# Patient Record
Sex: FEMALE | Race: BLACK OR AFRICAN AMERICAN | NOT HISPANIC OR LATINO | Employment: OTHER | URBAN - METROPOLITAN AREA
[De-identification: names, ages, dates, MRNs, and addresses within clinical notes are randomized per-mention and may not be internally consistent; named-entity substitution may affect disease eponyms.]

---

## 2017-05-07 ENCOUNTER — HOSPITAL ENCOUNTER (EMERGENCY)
Facility: HOSPITAL | Age: 67
Discharge: HOME | End: 2017-05-07
Attending: EMERGENCY MEDICINE
Payer: MEDICARE

## 2017-05-07 VITALS
SYSTOLIC BLOOD PRESSURE: 148 MMHG | BODY MASS INDEX: 32.65 KG/M2 | WEIGHT: 208 LBS | HEART RATE: 76 BPM | RESPIRATION RATE: 18 BRPM | HEIGHT: 67 IN | DIASTOLIC BLOOD PRESSURE: 71 MMHG | OXYGEN SATURATION: 100 % | TEMPERATURE: 98 F

## 2017-05-07 DIAGNOSIS — N39.0 URINARY TRACT INFECTION WITH HEMATURIA, SITE UNSPECIFIED: ICD-10-CM

## 2017-05-07 DIAGNOSIS — R53.1 WEAKNESS GENERALIZED: Primary | ICD-10-CM

## 2017-05-07 DIAGNOSIS — R31.9 URINARY TRACT INFECTION WITH HEMATURIA, SITE UNSPECIFIED: ICD-10-CM

## 2017-05-07 DIAGNOSIS — J06.9 UPPER RESPIRATORY TRACT INFECTION, UNSPECIFIED TYPE: ICD-10-CM

## 2017-05-07 DIAGNOSIS — R06.00 DYSPNEA, UNSPECIFIED TYPE: ICD-10-CM

## 2017-05-07 LAB
ALBUMIN SERPL BCP-MCNC: 4 G/DL
ALP SERPL-CCNC: 77 U/L
ALT SERPL W/O P-5'-P-CCNC: 12 U/L
ANION GAP SERPL CALC-SCNC: 13 MMOL/L
AST SERPL-CCNC: 15 U/L
BACTERIA #/AREA URNS HPF: ABNORMAL /HPF
BASOPHILS # BLD AUTO: 0.02 K/UL
BASOPHILS NFR BLD: 0.2 %
BILIRUB SERPL-MCNC: 0.5 MG/DL
BILIRUB UR QL STRIP: NEGATIVE
BNP SERPL-MCNC: 11 PG/ML
BUN SERPL-MCNC: 9 MG/DL
CALCIUM SERPL-MCNC: 9.4 MG/DL
CHLORIDE SERPL-SCNC: 106 MMOL/L
CLARITY UR: CLEAR
CO2 SERPL-SCNC: 22 MMOL/L
COLOR UR: ABNORMAL
CREAT SERPL-MCNC: 0.8 MG/DL
DIFFERENTIAL METHOD: NORMAL
EOSINOPHIL # BLD AUTO: 0.4 K/UL
EOSINOPHIL NFR BLD: 4.5 %
ERYTHROCYTE [DISTWIDTH] IN BLOOD BY AUTOMATED COUNT: 14.1 %
EST. GFR  (AFRICAN AMERICAN): >60 ML/MIN/1.73 M^2
EST. GFR  (NON AFRICAN AMERICAN): >60 ML/MIN/1.73 M^2
GLUCOSE SERPL-MCNC: 96 MG/DL
GLUCOSE UR QL STRIP: NEGATIVE
HCT VFR BLD AUTO: 42.4 %
HGB BLD-MCNC: 14.8 G/DL
HGB UR QL STRIP: ABNORMAL
INR PPP: 1
KETONES UR QL STRIP: NEGATIVE
LEUKOCYTE ESTERASE UR QL STRIP: ABNORMAL
LYMPHOCYTES # BLD AUTO: 2.3 K/UL
LYMPHOCYTES NFR BLD: 28.8 %
MAGNESIUM SERPL-MCNC: 2.2 MG/DL
MCH RBC QN AUTO: 28.6 PG
MCHC RBC AUTO-ENTMCNC: 34.9 %
MCV RBC AUTO: 82 FL
MICROSCOPIC COMMENT: ABNORMAL
MONOCYTES # BLD AUTO: 0.6 K/UL
MONOCYTES NFR BLD: 7 %
NEUTROPHILS # BLD AUTO: 4.8 K/UL
NEUTROPHILS NFR BLD: 59.5 %
NITRITE UR QL STRIP: NEGATIVE
PH UR STRIP: 5 [PH] (ref 5–8)
PLATELET # BLD AUTO: 274 K/UL
PMV BLD AUTO: 11.2 FL
POTASSIUM SERPL-SCNC: 3.5 MMOL/L
PROT SERPL-MCNC: 7.9 G/DL
PROT UR QL STRIP: NEGATIVE
PROTHROMBIN TIME: 10.4 SEC
RBC # BLD AUTO: 5.18 M/UL
RBC #/AREA URNS HPF: 5 /HPF (ref 0–4)
SODIUM SERPL-SCNC: 141 MMOL/L
SP GR UR STRIP: 1 (ref 1–1.03)
SQUAMOUS #/AREA URNS HPF: 10 /HPF
TROPONIN I SERPL DL<=0.01 NG/ML-MCNC: <0.006 NG/ML
URN SPEC COLLECT METH UR: ABNORMAL
UROBILINOGEN UR STRIP-ACNC: NEGATIVE EU/DL
WBC # BLD AUTO: 8.01 K/UL
WBC #/AREA URNS HPF: 8 /HPF (ref 0–5)

## 2017-05-07 PROCEDURE — 87086 URINE CULTURE/COLONY COUNT: CPT

## 2017-05-07 PROCEDURE — 83880 ASSAY OF NATRIURETIC PEPTIDE: CPT

## 2017-05-07 PROCEDURE — 93005 ELECTROCARDIOGRAM TRACING: CPT

## 2017-05-07 PROCEDURE — 99284 EMERGENCY DEPT VISIT MOD MDM: CPT

## 2017-05-07 PROCEDURE — 81000 URINALYSIS NONAUTO W/SCOPE: CPT

## 2017-05-07 PROCEDURE — 83735 ASSAY OF MAGNESIUM: CPT

## 2017-05-07 PROCEDURE — 80053 COMPREHEN METABOLIC PANEL: CPT

## 2017-05-07 PROCEDURE — 85025 COMPLETE CBC W/AUTO DIFF WBC: CPT

## 2017-05-07 PROCEDURE — 84484 ASSAY OF TROPONIN QUANT: CPT

## 2017-05-07 PROCEDURE — 85610 PROTHROMBIN TIME: CPT

## 2017-05-07 RX ORDER — HYDROCHLOROTHIAZIDE 12.5 MG/1
12.5 TABLET ORAL DAILY
COMMUNITY

## 2017-05-07 RX ORDER — CEPHALEXIN 500 MG/1
1000 CAPSULE ORAL EVERY 12 HOURS
Qty: 28 CAPSULE | Refills: 0 | Status: SHIPPED | OUTPATIENT
Start: 2017-05-07 | End: 2017-05-14

## 2017-05-07 RX ORDER — AMOXICILLIN 500 MG/1
500 CAPSULE ORAL 3 TIMES DAILY
COMMUNITY

## 2017-05-07 NOTE — ED TRIAGE NOTES
Pt states that for the past few weeks she has been having generalized weakness and intermittent sob (not always with exertion).  Worsened since yesterday after she started HCTZ. No LOC. Denies chest pain, headache, blurred vision and NVD

## 2017-05-07 NOTE — ED NOTES
Pt. Refused supplemental oxygen.    Placed back on bp cuff (q30 mins), cardiac monitor and pulse oximetry. Vss. nad.

## 2017-05-07 NOTE — ED PROVIDER NOTES
"Encounter Date: 5/7/2017    SCRIBE #1 NOTE: I, Chin Murray, am scribing for, and in the presence of,  Anselmo Ramirez MD. I have scribed the following portions of the note - Other sections scribed: HPI and ROS.       History     Chief Complaint   Patient presents with    Weakness     General weakness, SOB since this a.m.      Review of patient's allergies indicates:   Allergen Reactions    Codeine Hives     HPI Comments: CC: Weakness    HPI: This 66 y.o F with HTN, GERD and bronchitis presents to the ED c/o acute onset of intermittent generalized weakness with associated SOB x2 days. The pt was recently Rx hydrochlorothiazide and began taking it yesterday. The pt suspects the Rx "triggered" her symptoms. The pt states "I feel ok until I get up." Additionally, the pt reports nasal congestion which began 4/3/17.vThe pt denies abdominal pain, back pain and fever. No prior tx.         The history is provided by the patient. No  was used.     Past Medical History:   Diagnosis Date    Bronchitis     GERD (gastroesophageal reflux disease)     Hypertension      Past Surgical History:   Procedure Laterality Date    TUBAL LIGATION       Family History   Problem Relation Age of Onset    No Known Problems Mother     No Known Problems Father      Social History   Substance Use Topics    Smoking status: Never Smoker    Smokeless tobacco: Never Used    Alcohol use No     Review of Systems   Constitutional: Negative for fever.   HENT: Positive for congestion. Negative for sore throat.    Respiratory: Negative for shortness of breath.    Cardiovascular: Negative for chest pain.   Gastrointestinal: Negative for abdominal pain and nausea.   Genitourinary: Negative for dysuria.   Musculoskeletal: Negative for back pain.   Skin: Negative for rash.   Neurological: Positive for weakness (generalized). Negative for numbness.   Hematological: Does not bruise/bleed easily.       Physical Exam   Initial Vitals "   BP Pulse Resp Temp SpO2   05/07/17 1227 05/07/17 1227 05/07/17 1227 05/07/17 1227 05/07/17 1227   158/74 83 18 98.2 °F (36.8 °C) 94 %     Physical Exam    Nursing note and vitals reviewed.  HENT:   Head: Atraumatic.   Eyes: Conjunctivae and EOM are normal.   Neck: Normal range of motion.   Cardiovascular: Exam reveals no gallop and no friction rub.    No murmur heard.  Pulmonary/Chest: Breath sounds normal. No respiratory distress.   Abdominal: Soft. She exhibits no distension. There is no tenderness. There is no rebound.   Musculoskeletal: Normal range of motion. She exhibits no edema.   Neurological: She is alert and oriented to person, place, and time.   Psychiatric: She has a normal mood and affect.         ED Course   Procedures  Labs Reviewed   COMPREHENSIVE METABOLIC PANEL - Abnormal; Notable for the following:        Result Value    CO2 22 (*)     All other components within normal limits   URINALYSIS - Abnormal; Notable for the following:     Occult Blood UA 1+ (*)     Leukocytes, UA 2+ (*)     All other components within normal limits   URINALYSIS MICROSCOPIC - Abnormal; Notable for the following:     RBC, UA 5 (*)     WBC, UA 8 (*)     All other components within normal limits   CULTURE, URINE   TROPONIN I   CBC W/ AUTO DIFFERENTIAL   B-TYPE NATRIURETIC PEPTIDE   MAGNESIUM   PROTIME-INR             Medical Decision Making:   Initial Assessment:   66-year-old female presenting with generalized weakness and mild shortness of breath since last night.  Patient should return if symptoms to starting a new medication by primary care doctor for high blood pressure.  Symptoms will last briefly after taking a medicine and then resolved.  I will then however recur after taking her medication again.  On exam she appears well.  She is in no distress.  Vital signs unremarkable.  Neuro exam nonfocal.  CBC shows no leukocytosis or significant anemia.  Metabolic panel shows no evidence of renal dysfunction, electrolyte  abnormality, or notable liver dysfunction.  Cardiac markers are within normal limits.  EKG reviewed and interpreted myself shows no evidence of acute ischemia.  CXR reviewed and interpreted myself shows no focal infiltrates, pneumothorax or pleural effusion.  Urinalysis equivocal for urinary tract infection.  Patient does report increasing in for concern however unclear if this represents urinary tract infection or secondary to diuresis.  I will sent for urine culture and started on Keflex.  I think patient is safe and stable for discharge.  Primary care follow-up as soon as possible.  Return instructions given.  Patient verbalized understanding and agreement with plan.            Scribe Attestation:   Scribe #1: I performed the above scribed service and the documentation accurately describes the services I performed. I attest to the accuracy of the note.    Attending Attestation:           Physician Attestation for Scribe:  Physician Attestation Statement for Scribe #1: I, Anselmo Ramirez MD, reviewed documentation, as scribed by Chin Murray in my presence, and it is both accurate and complete.                 ED Course     Clinical Impression:   The primary encounter diagnosis was Weakness generalized. Diagnoses of Dyspnea, unspecified type, Upper respiratory tract infection, unspecified type, and Urinary tract infection with hematuria, site unspecified were also pertinent to this visit.          Anselmo Ramierz MD  05/07/17 0588

## 2017-05-07 NOTE — ED AVS SNAPSHOT
[facility]  [address] [place] [address]               Ching Prather   2017 12:29 PM   ED    Description:  Female : 1950   Department:  [facility]           Your Care was Coordinated By:     Provider Role From To    Anselmo Ramirez MD Attending Provider 17 1238 --      Reason for Visit     Weakness           Diagnoses this Visit        Comments    Weakness generalized    -  Primary     Dyspnea, unspecified type         Upper respiratory tract infection, unspecified type         Urinary tract infection with hematuria, site unspecified           ED Disposition     ED Disposition Condition Comment    Discharge             To Do List           Follow-up Information     Schedule an appointment as soon as possible for a visit with Malaika Coleman MD.    Specialty:  Internal Medicine    Contact information:    [facility] [facility]  [place] [address] [phone]         These Medications        Disp Refills Start End    cephALEXin (KEFLEX) 500 MG capsule 28 capsule 0 2017    Take 2 capsules (1,000 mg total) by mouth every 12 (twelve) hours. - Oral      [facility] On Call     [facility] On Call Nurse Care Line -  Assistance  Unless otherwise directed by your provider, please contact [facility], our nurse care line that is available for  assistance.     Registered nurses in the [facility] provide: appointment scheduling, clinical advisement, health education, and other advisory services.  Call: [phone] (toll free)               Medications           Message regarding Medications     Verify the changes and/or additions to your medication regime listed below are the same as discussed with your clinician today.  If any of these changes or additions are incorrect, please notify your healthcare provider.        START taking these NEW medications        Refills    cephALEXin (KEFLEX) 500 MG capsule 0     "Sig: Take 2 capsules (1,000 mg total) by mouth every 12 (twelve) hours.    Class: Print    Route: Oral      STOP taking these medications     azithromycin (ZITHROMAX) 1 gram Pack Take 1 g by mouth once.    pantoprazole (PROTONIX) 20 MG tablet Take 20 mg by mouth once daily.           Verify that the below list of medications is an accurate representation of the medications you are currently taking.  If none reported, the list may be blank. If incorrect, please contact your healthcare provider. Carry this list with you in case of emergency.           Current Medications     amoxicillin (AMOXIL) 500 MG capsule Take 500 mg by mouth 3 (three) times daily.    hydrochlorothiazide (HYDRODIURIL) 12.5 MG Tab Take 12.5 mg by mouth once daily.    losartan (COZAAR) 50 MG tablet Take 50 mg by mouth once daily.    cephALEXin (KEFLEX) 500 MG capsule Take 2 capsules (1,000 mg total) by mouth every 12 (twelve) hours.           Clinical Reference Information           Your Vitals Were     BP Pulse Temp Resp Height Weight    150/68 76 98.2 °F (36.8 °C) (Oral) 19 5' 7" (1.702 m) 94.3 kg (208 lb)    SpO2 BMI             100% 32.58 kg/m2         Allergies as of 5/7/2017        Reactions    Codeine Hives      Immunizations Administered on Date of Encounter - 5/7/2017     None      ED Micro, Lab, POCT     Start Ordered       Status Ordering Provider    05/07/17 1347 05/07/17 1346  Urine culture  STAT      Ordered     05/07/17 1300 05/07/17 1259  Troponin I  STAT      Final result     05/07/17 1300 05/07/17 1259  Comprehensive metabolic panel  STAT      Final result     05/07/17 1300 05/07/17 1259  CBC auto differential  STAT      Final result     05/07/17 1300 05/07/17 1259  Brain natriuretic peptide  STAT      Final result     05/07/17 1300 05/07/17 1259  Magnesium  STAT      Final result     05/07/17 1300 05/07/17 1259  Protime-INR  STAT      Final result     05/07/17 1300 05/07/17 1259  Urinalysis  STAT      Final result     05/07/17 " 1259 05/07/17 1259  Urinalysis Microscopic  Once      Final result       ED Imaging Orders     Start Ordered       Status Ordering Provider    05/07/17 1300 05/07/17 1259  X-Ray Chest AP Portable  1 time imaging      Final result       Discharge References/Attachments     URINARY TRACT INFECTIONS IN WOMEN (ENGLISH)    WEAKNESS (UNCERTAIN CAUSE) (ENGLISH)    SHORTNESS OF BREATH (DYSPNEA) (ENGLISH)      [facility] Sign-Up     Activating your [facility] account is as easy as 1-2-3!     1) Visit [email or web address], select Sign Up Now, enter this activation code and your date of birth, then select Next.  [email or web address]  Expires: 6/21/2017  1:48 PM      2) Create a username and password to use when you visit [facility] in the future and select a security question in case you lose your password and select Next.    3) Enter your e-mail address and click Sign Up!    Additional Information  If you have questions, please e-mail [email or web address] or call [phone] to talk to our [facility] staff. Remember, [facility] is NOT to be used for urgent needs. For medical emergencies, dial 911.          [facility] complies with applicable Federal civil rights laws and does not discriminate on the basis of race, color, national origin, age, disability, or sex.        Language Assistance Services     ATTENTION: Language assistance services are available, free of charge. Please call [phone].      ATENCIÓN: Si habla español, tiene a welch disposición servicios gratuitos de asistencia lingüística. Llame al [phone].     CHÚ Ý: N?u b?n nói Ti?ng Vi?t, có các d?ch v? h? tr? ngôn ng? mi?n phí dành cho b?n. G?i s? [phone].

## 2017-05-09 LAB — BACTERIA UR CULT: NORMAL

## 2022-02-21 ENCOUNTER — HOSPITAL ENCOUNTER (EMERGENCY)
Facility: HOSPITAL | Age: 72
Discharge: HOME | End: 2022-02-21
Attending: EMERGENCY MEDICINE
Payer: MEDICARE

## 2022-02-21 VITALS
OXYGEN SATURATION: 100 % | TEMPERATURE: 98 F | BODY MASS INDEX: 30.92 KG/M2 | HEART RATE: 75 BPM | HEIGHT: 67 IN | SYSTOLIC BLOOD PRESSURE: 157 MMHG | WEIGHT: 197 LBS | RESPIRATION RATE: 20 BRPM | DIASTOLIC BLOOD PRESSURE: 70 MMHG

## 2022-02-21 DIAGNOSIS — R55 SYNCOPE: Primary | ICD-10-CM

## 2022-02-21 DIAGNOSIS — F32.A DEPRESSION, UNSPECIFIED DEPRESSION TYPE: ICD-10-CM

## 2022-02-21 DIAGNOSIS — R40.20 LOSS OF CONSCIOUSNESS: ICD-10-CM

## 2022-02-21 LAB
ALBUMIN SERPL BCP-MCNC: 3.7 G/DL (ref 3.5–5.2)
ALP SERPL-CCNC: 71 U/L (ref 55–135)
ALT SERPL W/O P-5'-P-CCNC: 14 U/L (ref 10–44)
ANION GAP SERPL CALC-SCNC: 4 MMOL/L (ref 8–16)
AST SERPL-CCNC: 16 U/L (ref 10–40)
BASOPHILS # BLD AUTO: 0.04 K/UL (ref 0–0.2)
BASOPHILS NFR BLD: 0.6 % (ref 0–1.9)
BILIRUB SERPL-MCNC: 0.5 MG/DL (ref 0.1–1)
BILIRUB UR QL STRIP: NEGATIVE
BNP SERPL-MCNC: 70 PG/ML (ref 0–99)
BUN SERPL-MCNC: 10 MG/DL (ref 8–23)
CALCIUM SERPL-MCNC: 9.2 MG/DL (ref 8.7–10.5)
CHLORIDE SERPL-SCNC: 109 MMOL/L (ref 95–110)
CLARITY UR: CLEAR
CO2 SERPL-SCNC: 28 MMOL/L (ref 23–29)
COLOR UR: COLORLESS
CREAT SERPL-MCNC: 0.7 MG/DL (ref 0.5–1.4)
CTP QC/QA: YES
DIFFERENTIAL METHOD: NORMAL
EOSINOPHIL # BLD AUTO: 0.1 K/UL (ref 0–0.5)
EOSINOPHIL NFR BLD: 1.6 % (ref 0–8)
ERYTHROCYTE [DISTWIDTH] IN BLOOD BY AUTOMATED COUNT: 13.8 % (ref 11.5–14.5)
EST. GFR  (AFRICAN AMERICAN): >60 ML/MIN/1.73 M^2
EST. GFR  (NON AFRICAN AMERICAN): >60 ML/MIN/1.73 M^2
GLUCOSE SERPL-MCNC: 113 MG/DL (ref 70–110)
GLUCOSE UR QL STRIP: NEGATIVE
HCT VFR BLD AUTO: 41.4 % (ref 37–48.5)
HGB BLD-MCNC: 13.9 G/DL (ref 12–16)
HGB UR QL STRIP: ABNORMAL
IMM GRANULOCYTES # BLD AUTO: 0.02 K/UL (ref 0–0.04)
IMM GRANULOCYTES NFR BLD AUTO: 0.3 % (ref 0–0.5)
KETONES UR QL STRIP: NEGATIVE
LEUKOCYTE ESTERASE UR QL STRIP: NEGATIVE
LYMPHOCYTES # BLD AUTO: 1.9 K/UL (ref 1–4.8)
LYMPHOCYTES NFR BLD: 31.2 % (ref 18–48)
MAGNESIUM SERPL-MCNC: 1.7 MG/DL (ref 1.6–2.6)
MCH RBC QN AUTO: 28 PG (ref 27–31)
MCHC RBC AUTO-ENTMCNC: 33.6 G/DL (ref 32–36)
MCV RBC AUTO: 84 FL (ref 82–98)
MONOCYTES # BLD AUTO: 0.4 K/UL (ref 0.3–1)
MONOCYTES NFR BLD: 7.1 % (ref 4–15)
NEUTROPHILS # BLD AUTO: 3.7 K/UL (ref 1.8–7.7)
NEUTROPHILS NFR BLD: 59.2 % (ref 38–73)
NITRITE UR QL STRIP: NEGATIVE
NRBC BLD-RTO: 0 /100 WBC
PH UR STRIP: 7 [PH] (ref 5–8)
PHOSPHATE SERPL-MCNC: 2.3 MG/DL (ref 2.7–4.5)
PLATELET # BLD AUTO: 235 K/UL (ref 150–450)
PMV BLD AUTO: 11.3 FL (ref 9.2–12.9)
POTASSIUM SERPL-SCNC: 3.6 MMOL/L (ref 3.5–5.1)
PROT SERPL-MCNC: 7.4 G/DL (ref 6–8.4)
PROT UR QL STRIP: NEGATIVE
RBC # BLD AUTO: 4.96 M/UL (ref 4–5.4)
SARS-COV-2 RDRP RESP QL NAA+PROBE: NEGATIVE
SODIUM SERPL-SCNC: 141 MMOL/L (ref 136–145)
SP GR UR STRIP: 1.01 (ref 1–1.03)
TROPONIN I SERPL DL<=0.01 NG/ML-MCNC: 0.01 NG/ML (ref 0–0.03)
TROPONIN I SERPL DL<=0.01 NG/ML-MCNC: <0.006 NG/ML (ref 0–0.03)
TSH SERPL DL<=0.005 MIU/L-ACNC: 1.83 UIU/ML (ref 0.4–4)
URN SPEC COLLECT METH UR: ABNORMAL
UROBILINOGEN UR STRIP-ACNC: NEGATIVE EU/DL
WBC # BLD AUTO: 6.18 K/UL (ref 3.9–12.7)

## 2022-02-21 PROCEDURE — 93010 EKG 12-LEAD: ICD-10-PCS | Mod: ,,, | Performed by: INTERNAL MEDICINE

## 2022-02-21 PROCEDURE — 81003 URINALYSIS AUTO W/O SCOPE: CPT | Performed by: STUDENT IN AN ORGANIZED HEALTH CARE EDUCATION/TRAINING PROGRAM

## 2022-02-21 PROCEDURE — 93005 ELECTROCARDIOGRAM TRACING: CPT

## 2022-02-21 PROCEDURE — 84443 ASSAY THYROID STIM HORMONE: CPT | Performed by: STUDENT IN AN ORGANIZED HEALTH CARE EDUCATION/TRAINING PROGRAM

## 2022-02-21 PROCEDURE — 93010 ELECTROCARDIOGRAM REPORT: CPT | Mod: ,,, | Performed by: INTERNAL MEDICINE

## 2022-02-21 PROCEDURE — 25000003 PHARM REV CODE 250: Performed by: STUDENT IN AN ORGANIZED HEALTH CARE EDUCATION/TRAINING PROGRAM

## 2022-02-21 PROCEDURE — 84100 ASSAY OF PHOSPHORUS: CPT | Performed by: STUDENT IN AN ORGANIZED HEALTH CARE EDUCATION/TRAINING PROGRAM

## 2022-02-21 PROCEDURE — 84484 ASSAY OF TROPONIN QUANT: CPT | Performed by: STUDENT IN AN ORGANIZED HEALTH CARE EDUCATION/TRAINING PROGRAM

## 2022-02-21 PROCEDURE — 83735 ASSAY OF MAGNESIUM: CPT | Performed by: STUDENT IN AN ORGANIZED HEALTH CARE EDUCATION/TRAINING PROGRAM

## 2022-02-21 PROCEDURE — 80053 COMPREHEN METABOLIC PANEL: CPT | Performed by: STUDENT IN AN ORGANIZED HEALTH CARE EDUCATION/TRAINING PROGRAM

## 2022-02-21 PROCEDURE — U0002 COVID-19 LAB TEST NON-CDC: HCPCS | Performed by: STUDENT IN AN ORGANIZED HEALTH CARE EDUCATION/TRAINING PROGRAM

## 2022-02-21 PROCEDURE — 99285 EMERGENCY DEPT VISIT HI MDM: CPT | Mod: 25

## 2022-02-21 PROCEDURE — P9612 CATHETERIZE FOR URINE SPEC: HCPCS

## 2022-02-21 PROCEDURE — 83880 ASSAY OF NATRIURETIC PEPTIDE: CPT | Performed by: STUDENT IN AN ORGANIZED HEALTH CARE EDUCATION/TRAINING PROGRAM

## 2022-02-21 PROCEDURE — 85025 COMPLETE CBC W/AUTO DIFF WBC: CPT | Performed by: STUDENT IN AN ORGANIZED HEALTH CARE EDUCATION/TRAINING PROGRAM

## 2022-02-21 RX ORDER — SODIUM,POTASSIUM PHOSPHATES 280-250MG
1 POWDER IN PACKET (EA) ORAL ONCE
Status: COMPLETED | OUTPATIENT
Start: 2022-02-21 | End: 2022-02-21

## 2022-02-21 RX ORDER — HYDROXYZINE HYDROCHLORIDE 25 MG/1
25 TABLET, FILM COATED ORAL 3 TIMES DAILY PRN
Qty: 42 TABLET | Refills: 0 | Status: SHIPPED | OUTPATIENT
Start: 2022-02-21 | End: 2022-03-07

## 2022-02-21 RX ADMIN — Medication 1 PACKET: at 01:02

## 2022-02-21 NOTE — ED NOTES
"Orthostatic repeated and documented. Pt states to not take HTN medications as prescribed, pt has 2 types of BP meds and only takes one. Also commented to tech, to be "stressed and overwhelmed" and believe it is cause for blood pressure elevation. MD made aware.   "

## 2022-02-21 NOTE — ED TRIAGE NOTES
Pt a&ox33, calm and cooperative, c/o of sudden generalize weakness and dizziness when attempting to get out of bed this morning. Pt admits to hx of vertigo but not on medication. Denies fall, chest pain, sob, vision changes, abdominal discomfort. Respirations even/unlabored, NAD noted, sinus on cardiac monitor

## 2022-02-21 NOTE — ED PROVIDER NOTES
Encounter Date: 2/21/2022       History     Chief Complaint   Patient presents with    Loss of Consciousness     Pt to ER with c/o passing out this morning. PT reports now feels weak and dizzy. Pt denies CP, SOB, N/V     Ms. Prather is a 71-year-old female with history of hypertension,GERD and bronchitis who presents to the emergency department due to loss of consciousness. She states that around 9:00 a.m. this morning she was feeling weak and dizzy, she then passed out for a few minutes. She was concerned because she has never lost consciousness before so she came to the emergency department for evaluation. She states that she usually eats her 1st meal around 3:04 p.m. and never eats breakfast.  She denies any chest pain or shortness of breath before the episode. She also denies any history of fever, chills, nausea, vomiting or abdominal pain.  She states that she took her hypertension medication after she lost consciousness.         Review of patient's allergies indicates:   Allergen Reactions    Codeine Hives     Past Medical History:   Diagnosis Date    Bronchitis     GERD (gastroesophageal reflux disease)     Hypertension      Past Surgical History:   Procedure Laterality Date    TUBAL LIGATION       Family History   Problem Relation Age of Onset    No Known Problems Mother     No Known Problems Father      Social History     Tobacco Use    Smoking status: Never Smoker    Smokeless tobacco: Never Used   Substance Use Topics    Alcohol use: No    Drug use: No     Review of Systems   Constitutional: Negative for activity change, appetite change, chills, fatigue and fever.   HENT: Negative for congestion, rhinorrhea, sinus pressure, sinus pain and trouble swallowing.    Eyes: Negative for photophobia, pain and visual disturbance.   Respiratory: Negative for cough, chest tightness, shortness of breath and wheezing.    Cardiovascular: Negative for chest pain and palpitations.   Gastrointestinal: Negative  for abdominal pain, constipation, diarrhea, nausea and vomiting.   Genitourinary: Negative for decreased urine volume, difficulty urinating, dysuria, flank pain, hematuria, menstrual problem, pelvic pain and urgency.   Musculoskeletal: Negative for arthralgias, back pain, joint swelling and myalgias.   Skin: Negative for color change and wound.   Neurological: Positive for syncope and light-headedness. Negative for dizziness, seizures, facial asymmetry, weakness, numbness and headaches.   Psychiatric/Behavioral: Negative for agitation and confusion.       Physical Exam     Initial Vitals [02/21/22 1110]   BP Pulse Resp Temp SpO2   (!) 201/90 74 18 97.9 °F (36.6 °C) 98 %      MAP       --         Physical Exam    Nursing note and vitals reviewed.  Constitutional: She appears well-developed and well-nourished. She is not diaphoretic. No distress.   HENT:   Head: Normocephalic and atraumatic.   Mouth/Throat: Oropharynx is clear and moist. No oropharyngeal exudate.   Eyes: Conjunctivae and EOM are normal. Pupils are equal, round, and reactive to light. Right eye exhibits no discharge. Left eye exhibits no discharge. No scleral icterus.   Neck: No thyromegaly present. No tracheal deviation present. No JVD present.   Normal range of motion.  Cardiovascular: Normal rate, regular rhythm, normal heart sounds and intact distal pulses. Exam reveals no gallop and no friction rub.    No murmur heard.  Pulmonary/Chest: Breath sounds normal. No respiratory distress. She has no wheezes. She has no rales. She exhibits no tenderness.   Abdominal: Abdomen is soft. Bowel sounds are normal. She exhibits no distension and no mass. There is no abdominal tenderness. There is no guarding.   Musculoskeletal:         General: No tenderness or edema. Normal range of motion.      Cervical back: Normal range of motion.     Neurological: She is alert and oriented to person, place, and time. She has normal strength. She displays normal reflexes.  No cranial nerve deficit or sensory deficit.   Skin: Skin is warm. Capillary refill takes less than 2 seconds. No erythema. No pallor.   Psychiatric: She has a normal mood and affect.         ED Course   Procedures  Labs Reviewed   COMPREHENSIVE METABOLIC PANEL - Abnormal; Notable for the following components:       Result Value    Glucose 113 (*)     Anion Gap 4 (*)     All other components within normal limits   URINALYSIS, REFLEX TO URINE CULTURE - Abnormal; Notable for the following components:    Color, UA Colorless (*)     Occult Blood UA Trace (*)     All other components within normal limits    Narrative:     Specimen Source->Urine   PHOSPHORUS - Abnormal; Notable for the following components:    Phosphorus 2.3 (*)     All other components within normal limits   CBC W/ AUTO DIFFERENTIAL   B-TYPE NATRIURETIC PEPTIDE   TROPONIN I   MAGNESIUM   TSH   TROPONIN I   SARS-COV-2 RDRP GENE    Narrative:     This test utilizes isothermal nucleic acid amplification   technology to detect the SARS-CoV-2 RdRp nucleic acid segment.   The analytical sensitivity (limit of detection) is 125 genome   equivalents/mL.   A POSITIVE result implies infection with the SARS-CoV-2 virus;   the patient is presumed to be contagious.     A NEGATIVE result means that SARS-CoV-2 nucleic acids are not   present above the limit of detection. A NEGATIVE result should be   treated as presumptive. It does not rule out the possibility of   COVID-19 and should not be the sole basis for treatment decisions.   If COVID-19 is strongly suspected based on clinical and exposure   history, re-testing using an alternate molecular assay should be   considered.   This test is only for use under the Food and Drug   Administration s Emergency Use Authorization (EUA).   Commercial kits are provided by [employer].   Performance characteristics of the EUA have been independently   verified by [facility] Department of   Pathology and  Laboratory Medicine.   _________________________________________________________________   The authorized Fact Sheet for Healthcare Providers and the authorized Fact   Sheet for Patients of the ID NOW COVID-19 are available on the FDA   website:     https://www.fda.gov/media/935019/download  https://www.fda.gov/media/237064/download               ECG Results          EKG 12-lead (Final result)  Result time 02/21/22 19:23:01    Final result by Interface, Lab In [facility] (02/21/22 19:23:01)                 Narrative:    Test Reason : R55,    Vent. Rate : 075 BPM     Atrial Rate : 075 BPM     P-R Int : 156 ms          QRS Dur : 098 ms      QT Int : 388 ms       P-R-T Axes : 064 -49 064 degrees     QTc Int : 433 ms    Normal sinus rhythm  Left axis deviation  Cannot rule out Anterior infarct (cited on or before 07-MAY-2017)  Abnormal ECG  When compared with ECG of 07-MAY-2017 12:43,  Significant changes have occurred  Confirmed by Joe Gomes MD ([id number]) on 2/21/2022 7:22:48 PM    Referred By: [email or web address]   SELF           Confirmed By:Joe Gomes MD                            Imaging Results          CT Head Without Contrast (Final result)  Result time 02/21/22 13:03:01    Final result by John Ramey Jr., MD (02/21/22 13:03:01)                 Impression:      Normal      Electronically signed by: John Juan Jr  Date:    02/21/2022  Time:    13:03             Narrative:    EXAMINATION:  CT HEAD WITHOUT CONTRAST    CLINICAL HISTORY:  Syncope, recurrent;    TECHNIQUE:  Low dose axial images were obtained through the head.  Coronal and sagittal reformations were also performed. Contrast was not administered.    COMPARISON:  08/27/2013    FINDINGS:  The CSF spaces, brain parenchyma, and bony calvarium are intact.There is no evidence of hemorrhage, mass, or acute infarct.    The visualized paranasal sinuses are clear.                               X-Ray Chest AP Portable (Final result)  Result time 02/21/22  "11:53:16    Final result by Adelfo Neff MD (02/21/22 11:53:16)                 Impression:      No acute process or detrimental change when compared with 05/07/2017.      Electronically signed by: Adelfo Neff MD  Date:    02/21/2022  Time:    11:53             Narrative:    EXAMINATION:  XR CHEST AP PORTABLE    CLINICAL HISTORY:  Provided history is "loss of consciousness;  ".    TECHNIQUE:  One view of the chest.    COMPARISON:  05/07/2017.    FINDINGS:  Cardiac wires overlie the chest.  Cardiomediastinal silhouette is not significantly enlarged.  No focal consolidation.  No sizable pleural effusion.  No pneumothorax.                                 Medications   potassium, sodium phosphates 280-160-250 mg packet 1 packet (1 packet Oral Given 2/21/22 1315)     Medical Decision Making:   History:   Old Medical Records: I decided to obtain old medical records.  Old Records Summarized: records from previous admission(s).  Initial Assessment:   Ms. Prather is a 71-year-old female with history of hypertension,GERD and bronchitis who presents to the emergency department due to loss of consciousness.   Differential Diagnosis:   Vasovagal syncope  Cardiogenic syncope  Stroke/TIA  Electrolyte abnormality  Clinical Tests:   Lab Tests: Ordered and Reviewed  Radiological Study: Ordered and Reviewed  ED Management:  Patient was examined, labs were ordered including a CBC which was non-significant,   CMP was significant for low phosphorus which was repleted.   Urinalysis did not show any signs of infection.   EKG and troponin were within normal limits  Patient was re-evaluated and she reported symptomatic improvement.   Orthostatics were obtained and the patient was within normal limits.  On examination, patient endorsed that she has been under lot of stress lately and really needed someone to talk to so she was given a referral to Psychiatry.  She was given a prescription for Hydroxyzine.   She was discharged in stable " condition and return precautions were given.                       Clinical Impression:   Final diagnoses:  [R55] Syncope (Primary)  [R40.20] Loss of consciousness  [F32.A] Depression, unspecified depression type          ED Disposition Condition    Discharge Stable        ED Prescriptions     Medication Sig Dispense Start Date End Date Auth. Provider    hydrOXYzine HCL (ATARAX) 25 MG tablet Take 1 tablet (25 mg total) by mouth 3 (three) times daily as needed for Itching. 42 tablet 2/21/2022 3/7/2022 Slade Fernandez MD        Follow-up Information     Follow up With Specialties Details Why Contact Info Additional Information    Malaika Coleman MD Internal Medicine Schedule an appointment as soon as possible for a visit in 3 days  [address] [facility] [address] [phone]       [facility] Psychiatry Psychiatry Schedule an appointment as soon as possible for a visit in 3 days  [address] [facility] [address], [address]  [facility] [address] [phone] Please park in garage or Medical Ofc Bldg surface lot and use Medical Office Bldg elevator. Check in at [address].           Slade Fernandez MD  Resident  02/21/22 1958

## 2022-02-21 NOTE — DISCHARGE INSTRUCTIONS
Thank you for visiting us Today!    Please follow up with your primary care physician concerning your symptoms.   Please ensure to follow up with cardiology tomorrow.

## 2022-04-10 ENCOUNTER — HOSPITAL ENCOUNTER (EMERGENCY)
Facility: HOSPITAL | Age: 72
Discharge: HOME | End: 2022-04-10
Attending: EMERGENCY MEDICINE
Payer: MEDICARE

## 2022-04-10 VITALS
SYSTOLIC BLOOD PRESSURE: 149 MMHG | RESPIRATION RATE: 16 BRPM | WEIGHT: 197 LBS | HEIGHT: 67 IN | OXYGEN SATURATION: 99 % | TEMPERATURE: 99 F | BODY MASS INDEX: 30.92 KG/M2 | HEART RATE: 78 BPM | DIASTOLIC BLOOD PRESSURE: 82 MMHG

## 2022-04-10 DIAGNOSIS — M54.50 ACUTE BILATERAL LOW BACK PAIN WITHOUT SCIATICA: ICD-10-CM

## 2022-04-10 DIAGNOSIS — J06.9 VIRAL URI WITH COUGH: Primary | ICD-10-CM

## 2022-04-10 LAB
BACTERIA #/AREA URNS HPF: NORMAL /HPF
BILIRUB UR QL STRIP: NEGATIVE
CAOX CRY URNS QL MICRO: NORMAL
CLARITY UR: CLEAR
COLOR UR: YELLOW
CTP QC/QA: YES
CTP QC/QA: YES
GLUCOSE UR QL STRIP: NEGATIVE
HGB UR QL STRIP: ABNORMAL
KETONES UR QL STRIP: NEGATIVE
LEUKOCYTE ESTERASE UR QL STRIP: NEGATIVE
MICROSCOPIC COMMENT: NORMAL
NITRITE UR QL STRIP: NEGATIVE
PH UR STRIP: 6 [PH] (ref 5–8)
POC MOLECULAR INFLUENZA A AGN: NEGATIVE
POC MOLECULAR INFLUENZA B AGN: NEGATIVE
PROT UR QL STRIP: NEGATIVE
RBC #/AREA URNS HPF: 2 /HPF (ref 0–4)
SARS-COV-2 RDRP RESP QL NAA+PROBE: NEGATIVE
SP GR UR STRIP: 1.01 (ref 1–1.03)
URN SPEC COLLECT METH UR: ABNORMAL
UROBILINOGEN UR STRIP-ACNC: NEGATIVE EU/DL

## 2022-04-10 PROCEDURE — 99284 EMERGENCY DEPT VISIT MOD MDM: CPT | Mod: 25

## 2022-04-10 PROCEDURE — U0002 COVID-19 LAB TEST NON-CDC: HCPCS | Performed by: PHYSICIAN ASSISTANT

## 2022-04-10 PROCEDURE — 81000 URINALYSIS NONAUTO W/SCOPE: CPT | Performed by: PHYSICIAN ASSISTANT

## 2022-04-10 RX ORDER — BENZONATATE 200 MG/1
200 CAPSULE ORAL 3 TIMES DAILY PRN
Qty: 30 CAPSULE | Refills: 0 | Status: SHIPPED | OUTPATIENT
Start: 2022-04-10 | End: 2022-04-20

## 2022-04-10 RX ORDER — METHOCARBAMOL 500 MG/1
500 TABLET, FILM COATED ORAL 3 TIMES DAILY PRN
Qty: 20 TABLET | Refills: 0 | Status: SHIPPED | OUTPATIENT
Start: 2022-04-10 | End: 2022-04-15

## 2022-04-10 RX ORDER — AMLODIPINE BESYLATE 2.5 MG/1
2.5 TABLET ORAL DAILY
COMMUNITY

## 2022-04-10 RX ORDER — NITROFURANTOIN 25; 75 MG/1; MG/1
100 CAPSULE ORAL 2 TIMES DAILY
COMMUNITY

## 2022-04-11 NOTE — ED TRIAGE NOTES
Pt reports waking from nap today with chills and sweats along with feeling warm to touch. 1 episode of diarrhea yesterday. Started on Macrobid for recent dx of uti. Bilateral flank pain and suprapubic pain. No pain or burning with urination aox4

## 2022-04-11 NOTE — ED PROVIDER NOTES
Encounter Date: 4/10/2022       History     Chief Complaint   Patient presents with    Urinary Tract Infection     Recent dx with UTI, started on ABX on Friday with no improvement. Pt reports new onset of fever and chills.      72yo F with chief complaint L sided low back pain, increased urinary frequency x4d. Also with cough, congestion, rhinorrhea, mild sinus pressure since yesterday. She states woke today with chills and fever.     No myalgias. No SOB or CP. States there is mild bilateral posterolateral rib pain during cough. No lightheadedness, syncope or near syncope, palpitations, n/v. No recent illness or known sick contacts.     No abdominal pain. No flank pain. No frequent UTIs. Denies dysuria, hematuria, urgency, hesitancy, strong odor to urine, or difficulty with urination. Seen by PCP on 4/8, diagnosed with UTI, on Macrobid--compliant. Denies radiation of her back pain. States pain mostly L sided, but sometimes R sided. No leg weakness. Denies any radiation of her pain into her side or abdomen. No hx nephrolithiasis. Back pain is intermittent, worse with certain movements.  No bowel or bladder incontinence or retention.  No saddle anesthesia.  No recent back trauma.  No history of spinal issues.    PMH:  Anxiety  Obesity  HTN  HLD  GERD  History of malaise and fatigue  History of palpitations        Review of patient's allergies indicates:   Allergen Reactions    Codeine Hives     Past Medical History:   Diagnosis Date    Bronchitis     GERD (gastroesophageal reflux disease)     Hypertension      Past Surgical History:   Procedure Laterality Date    TUBAL LIGATION       Family History   Problem Relation Age of Onset    No Known Problems Mother     No Known Problems Father      Social History     Tobacco Use    Smoking status: Never Smoker    Smokeless tobacco: Never Used   Substance Use Topics    Alcohol use: No    Drug use: No     Review of Systems   Constitutional: Positive for chills and  fever.   HENT: Positive for congestion and sinus pressure. Negative for sore throat.    Respiratory: Positive for cough.    Cardiovascular: Negative for chest pain.   Gastrointestinal: Negative for abdominal pain, diarrhea, nausea and vomiting.   Genitourinary: Positive for frequency. Negative for dysuria, flank pain, hematuria and urgency.   Musculoskeletal: Positive for back pain. Negative for myalgias, neck pain and neck stiffness.   Neurological: Negative for weakness, light-headedness and numbness.       Physical Exam     Initial Vitals [04/10/22 2058]   BP Pulse Resp Temp SpO2   (!) 149/82 78 16 98.9 °F (37.2 °C) 99 %      MAP       --         Physical Exam    Nursing note and vitals reviewed.  Constitutional: She appears well-developed and well-nourished. She is not diaphoretic. No distress.   Well-appearing nontoxic.  Sitting upright on exam table.   HENT:   Head: Normocephalic and atraumatic.   Mouth/Throat: Oropharynx is clear and moist.   Nasal congestion, boggy mucosa, turbinate hypertrophy bilaterally.   Neck: Neck supple.   Normal range of motion.  Cardiovascular: Intact distal pulses.   1+ DP bilaterally   Pulmonary/Chest: Breath sounds normal. No respiratory distress.   Abdominal: Abdomen is soft. Bowel sounds are normal. She exhibits no distension. There is no abdominal tenderness.   Mild left flank tenderness.  No CVA tenderness.   Musculoskeletal:         General: No tenderness. Normal range of motion.      Cervical back: Normal range of motion and neck supple.      Comments: No midline spinal tenderness     Neurological: She is alert and oriented to person, place, and time. GCS score is 15. GCS eye subscore is 4. GCS verbal subscore is 5. GCS motor subscore is 6.   Skin: Skin is warm. Capillary refill takes less than 2 seconds.   Psychiatric: She has a normal mood and affect. Thought content normal.         ED Course   Procedures  Labs Reviewed   URINALYSIS, REFLEX TO URINE CULTURE - Abnormal;  Notable for the following components:       Result Value    Occult Blood UA 1+ (*)     All other components within normal limits    Narrative:     Specimen Source->Urine   URINALYSIS MICROSCOPIC    Narrative:     Specimen Source->Urine   POCT INFLUENZA A/B MOLECULAR   SARS-COV-2 RDRP GENE          Imaging Results    None          Medications - No data to display  Medical Decision Making:   Differential Diagnosis:   Nephrolithiasis, pyelonephritis, cystitis, chronic low back pain, lumbar strain, sinusitis, viral illness, nasal congestion, rhinitis, URI  Clinical Tests:   Lab Tests: Ordered and Reviewed  ED Management:  Low back pain is intermittent, nonradiating.  She states sometimes there is also right-sided low back pain, along mostly left-sided.  Pain is worsened with certain movements.  Denies any leg weakness.  No saddle anesthesia.    She denies history nephrolithiasis.  Has had hematuria previous urinalysis.  No evidence of infection at this time.    Admits to fever today, however has not taken any antipyretics.  She is afebrile on exam.  Normal heart rate.  No shortness of breath.  No chest pain.  She states there is bilateral posterolateral chest wall discomfort during cough.  She admits to associated nasal congestion, rhinorrhea, postnasal drip, mild sinus pressure.  No severe headache.  No neck pain or stiffness.    Symmetric lower extremity pulses.  No lightheadedness, no syncope near syncope, no ripping/tearing pain, only mildly elevated BP, low suspicion for dissection.    Will treat as MSK-related, lumbar strain along with viral URI. Advised f/u with PCP for reevaluation for any persistent symptoms. Return precautions discussed/given.                      Clinical Impression:   Final diagnoses:  [J06.9] Viral URI with cough (Primary)  [M54.50] Acute bilateral low back pain without sciatica          ED Disposition Condition    Discharge Stable        ED Prescriptions     Medication Sig Dispense Start  Date End Date Auth. Provider    benzonatate (TESSALON) 200 MG capsule Take 1 capsule (200 mg total) by mouth 3 (three) times daily as needed for Cough. 30 capsule 4/10/2022 4/20/2022 Wale Parikh PA-C    methocarbamoL (ROBAXIN) 500 MG Tab Take 1 tablet (500 mg total) by mouth 3 (three) times daily as needed (Muscle stiffness/soreness). 20 tablet 4/10/2022 4/15/2022 Wale Parikh PA-C        Follow-up Information     Follow up With Specialties Details Why Contact Info    Malaika Coleman MD Internal Medicine Schedule an appointment as soon as possible for a visit  For reevaluation [address]  [place] [address]  [phone]             Wale Parikh PA-C  04/11/22 0405

## 2022-04-11 NOTE — DISCHARGE INSTRUCTIONS
Finish your antibiotic prescription as previously prescribed. Continue with zyrtec/claritin daily. Frequent over the counter saline nasal rinses may also help with congestion, runny nose. Tessalon to help with cough. Tylenol as needed for back pain, rib pain.  Use the Robaxin as needed for back pain or stiffness.  Follow-up with primary care provider for re-evaluation should your symptoms persist.    Return to this ED if you experience worsening back pain, if you begin with leg weakness, if unable to walk or bear weight, if you begin with nausea vomiting, if unable to treat fever, if worsening cough, if you begin with shortness of breath or chest pain, if any other problems occur.

## 2022-05-30 ENCOUNTER — HOSPITAL ENCOUNTER (EMERGENCY)
Facility: HOSPITAL | Age: 72
Discharge: HOME | End: 2022-05-30
Attending: EMERGENCY MEDICINE
Payer: MEDICARE

## 2022-05-30 VITALS
OXYGEN SATURATION: 97 % | SYSTOLIC BLOOD PRESSURE: 152 MMHG | WEIGHT: 205 LBS | RESPIRATION RATE: 19 BRPM | BODY MASS INDEX: 32.18 KG/M2 | DIASTOLIC BLOOD PRESSURE: 81 MMHG | TEMPERATURE: 98 F | HEART RATE: 86 BPM | HEIGHT: 67 IN

## 2022-05-30 DIAGNOSIS — R63.1 INCREASED THIRST: ICD-10-CM

## 2022-05-30 DIAGNOSIS — R00.2 PALPITATIONS: ICD-10-CM

## 2022-05-30 DIAGNOSIS — R73.09 ELEVATED RANDOM BLOOD GLUCOSE LEVEL: ICD-10-CM

## 2022-05-30 DIAGNOSIS — R03.0 ELEVATED BLOOD PRESSURE READING: ICD-10-CM

## 2022-05-30 DIAGNOSIS — R68.2 DRY MOUTH: ICD-10-CM

## 2022-05-30 DIAGNOSIS — R35.89 POLYURIA: ICD-10-CM

## 2022-05-30 DIAGNOSIS — I49.3 PVC'S (PREMATURE VENTRICULAR CONTRACTIONS): ICD-10-CM

## 2022-05-30 DIAGNOSIS — R53.83 FATIGUE: Primary | ICD-10-CM

## 2022-05-30 LAB
ALBUMIN SERPL BCP-MCNC: 3.5 G/DL (ref 3.5–5.2)
ALP SERPL-CCNC: 72 U/L (ref 55–135)
ALT SERPL W/O P-5'-P-CCNC: 13 U/L (ref 10–44)
ANION GAP SERPL CALC-SCNC: 8 MMOL/L (ref 8–16)
AST SERPL-CCNC: 16 U/L (ref 10–40)
BASOPHILS # BLD AUTO: 0.04 K/UL (ref 0–0.2)
BASOPHILS NFR BLD: 0.6 % (ref 0–1.9)
BILIRUB SERPL-MCNC: 0.3 MG/DL (ref 0.1–1)
BILIRUB UR QL STRIP: NEGATIVE
BNP SERPL-MCNC: 44 PG/ML (ref 0–99)
BUN SERPL-MCNC: 13 MG/DL (ref 8–23)
CALCIUM SERPL-MCNC: 8.9 MG/DL (ref 8.7–10.5)
CHLORIDE SERPL-SCNC: 107 MMOL/L (ref 95–110)
CLARITY UR: CLEAR
CO2 SERPL-SCNC: 26 MMOL/L (ref 23–29)
COLOR UR: COLORLESS
CREAT SERPL-MCNC: 0.7 MG/DL (ref 0.5–1.4)
CTP QC/QA: YES
CTP QC/QA: YES
DIFFERENTIAL METHOD: NORMAL
EOSINOPHIL # BLD AUTO: 0.3 K/UL (ref 0–0.5)
EOSINOPHIL NFR BLD: 3.7 % (ref 0–8)
ERYTHROCYTE [DISTWIDTH] IN BLOOD BY AUTOMATED COUNT: 13.3 % (ref 11.5–14.5)
EST. GFR  (AFRICAN AMERICAN): >60 ML/MIN/1.73 M^2
EST. GFR  (NON AFRICAN AMERICAN): >60 ML/MIN/1.73 M^2
ESTIMATED AVG GLUCOSE: 111 MG/DL (ref 68–131)
GLUCOSE SERPL-MCNC: 160 MG/DL (ref 70–110)
GLUCOSE UR QL STRIP: NEGATIVE
HBA1C MFR BLD: 5.5 % (ref 4–5.6)
HCT VFR BLD AUTO: 38.2 % (ref 37–48.5)
HGB BLD-MCNC: 13 G/DL (ref 12–16)
HGB UR QL STRIP: ABNORMAL
IMM GRANULOCYTES # BLD AUTO: 0.02 K/UL (ref 0–0.04)
IMM GRANULOCYTES NFR BLD AUTO: 0.3 % (ref 0–0.5)
KETONES UR QL STRIP: NEGATIVE
LEUKOCYTE ESTERASE UR QL STRIP: NEGATIVE
LYMPHOCYTES # BLD AUTO: 2 K/UL (ref 1–4.8)
LYMPHOCYTES NFR BLD: 28.7 % (ref 18–48)
MCH RBC QN AUTO: 28.7 PG (ref 27–31)
MCHC RBC AUTO-ENTMCNC: 34 G/DL (ref 32–36)
MCV RBC AUTO: 84 FL (ref 82–98)
MONOCYTES # BLD AUTO: 0.6 K/UL (ref 0.3–1)
MONOCYTES NFR BLD: 8.2 % (ref 4–15)
NEUTROPHILS # BLD AUTO: 4.1 K/UL (ref 1.8–7.7)
NEUTROPHILS NFR BLD: 58.5 % (ref 38–73)
NITRITE UR QL STRIP: NEGATIVE
NRBC BLD-RTO: 0 /100 WBC
PH UR STRIP: 7 [PH] (ref 5–8)
PLATELET # BLD AUTO: 226 K/UL (ref 150–450)
PMV BLD AUTO: 11.3 FL (ref 9.2–12.9)
POC MOLECULAR INFLUENZA A AGN: NEGATIVE
POC MOLECULAR INFLUENZA B AGN: NEGATIVE
POCT GLUCOSE: 148 MG/DL (ref 70–110)
POTASSIUM SERPL-SCNC: 3.3 MMOL/L (ref 3.5–5.1)
PROT SERPL-MCNC: 6.9 G/DL (ref 6–8.4)
PROT UR QL STRIP: NEGATIVE
RBC # BLD AUTO: 4.53 M/UL (ref 4–5.4)
SARS-COV-2 RDRP RESP QL NAA+PROBE: NEGATIVE
SODIUM SERPL-SCNC: 141 MMOL/L (ref 136–145)
SP GR UR STRIP: 1 (ref 1–1.03)
TROPONIN I SERPL DL<=0.01 NG/ML-MCNC: <0.006 NG/ML (ref 0–0.03)
TSH SERPL DL<=0.005 MIU/L-ACNC: 3.44 UIU/ML (ref 0.4–4)
URN SPEC COLLECT METH UR: ABNORMAL
UROBILINOGEN UR STRIP-ACNC: NEGATIVE EU/DL
WBC # BLD AUTO: 7.07 K/UL (ref 3.9–12.7)

## 2022-05-30 PROCEDURE — 85025 COMPLETE CBC W/AUTO DIFF WBC: CPT | Performed by: EMERGENCY MEDICINE

## 2022-05-30 PROCEDURE — 83880 ASSAY OF NATRIURETIC PEPTIDE: CPT | Performed by: EMERGENCY MEDICINE

## 2022-05-30 PROCEDURE — 99284 EMERGENCY DEPT VISIT MOD MDM: CPT | Mod: 25

## 2022-05-30 PROCEDURE — 93005 ELECTROCARDIOGRAM TRACING: CPT

## 2022-05-30 PROCEDURE — 81003 URINALYSIS AUTO W/O SCOPE: CPT | Performed by: EMERGENCY MEDICINE

## 2022-05-30 PROCEDURE — 93010 ELECTROCARDIOGRAM REPORT: CPT | Mod: ,,, | Performed by: INTERNAL MEDICINE

## 2022-05-30 PROCEDURE — 83036 HEMOGLOBIN GLYCOSYLATED A1C: CPT | Performed by: EMERGENCY MEDICINE

## 2022-05-30 PROCEDURE — 87502 INFLUENZA DNA AMP PROBE: CPT

## 2022-05-30 PROCEDURE — 93010 EKG 12-LEAD: ICD-10-PCS | Mod: ,,, | Performed by: INTERNAL MEDICINE

## 2022-05-30 PROCEDURE — 80053 COMPREHEN METABOLIC PANEL: CPT | Performed by: EMERGENCY MEDICINE

## 2022-05-30 PROCEDURE — 96360 HYDRATION IV INFUSION INIT: CPT

## 2022-05-30 PROCEDURE — 84443 ASSAY THYROID STIM HORMONE: CPT | Performed by: EMERGENCY MEDICINE

## 2022-05-30 PROCEDURE — 82962 GLUCOSE BLOOD TEST: CPT

## 2022-05-30 PROCEDURE — 84484 ASSAY OF TROPONIN QUANT: CPT | Performed by: EMERGENCY MEDICINE

## 2022-05-30 PROCEDURE — 25000003 PHARM REV CODE 250: Performed by: EMERGENCY MEDICINE

## 2022-05-30 PROCEDURE — U0002 COVID-19 LAB TEST NON-CDC: HCPCS | Performed by: EMERGENCY MEDICINE

## 2022-05-30 RX ADMIN — SODIUM CHLORIDE 1000 ML: 0.9 INJECTION, SOLUTION INTRAVENOUS at 03:05

## 2022-05-30 NOTE — ED PROVIDER NOTES
Encounter Date: 5/30/2022       History     Chief Complaint   Patient presents with    Dehydration     Presents with complaint of dry mouth and feeling of dehydration, reports urinary frequency     70 yo female presents via personal transportation with palpitations, dry mouth, increased thirst, and urinary frequency.      Patient saw PMD about 3.5 weeks ago (5/5/22) for aching suprapubic pain, urinary frequency, urinary urgency, and flank pain.  She had moderate leuks on UA and was rx'ed Ciprofloxacin.  Patient followed up to Dr. Coleman last week (5/23/22) as she was still having bilateral flank pain.  She still had trace leuks on UA, so she was rx'ed Nitrofurantoin 100mg PO BID.      Patient reports that since she started Nitrofurantoin, she has had dry mouth, as well as increased thirst.  Urinary frequency has been present since initial UTI diagnosis.  No dysuria.  Patient had suprapubic pain but this has resolved.  No nausea, vomiting, diarrhea, fevers.      Patient also reports palpitations which have been present since she started Nitrofurantoin.  She denies shortness of breath or chest pain.      Patient was also tx'ed for UTI with Nitrofurantoin about 1.5 months ago (4/8/22).      PMD: Dr. Nichole Coleman    PMH: palpitations, HTN, DLD, pre-diabetes, GERD, obesity  PSH: BTL        Review of patient's allergies indicates:   Allergen Reactions    Codeine Hives     Past Medical History:   Diagnosis Date    Bronchitis     GERD (gastroesophageal reflux disease)     Hypertension      Past Surgical History:   Procedure Laterality Date    TUBAL LIGATION       Family History   Problem Relation Age of Onset    No Known Problems Mother     No Known Problems Father      Social History     Tobacco Use    Smoking status: Never Smoker    Smokeless tobacco: Never Used   Substance Use Topics    Alcohol use: No    Drug use: No     Review of Systems   Constitutional: Negative for fever.   HENT: Negative for rhinorrhea  and sore throat.    Eyes: Negative for visual disturbance.   Respiratory: Negative for cough.    Cardiovascular: Positive for palpitations. Negative for chest pain.   Gastrointestinal: Positive for abdominal pain (suprapubic, resolved).   Endocrine: Positive for polydipsia.   Genitourinary: Positive for frequency. Negative for dysuria.   Musculoskeletal: Positive for back pain (resolved). Negative for gait problem.   Skin: Negative for rash.   Neurological: Negative for syncope.       Physical Exam     Initial Vitals [05/30/22 0243]   BP Pulse Resp Temp SpO2   (!) 218/94 79 19 97.8 °F (36.6 °C) 99 %      MAP       --         Physical Exam    Nursing note and vitals reviewed.  Constitutional: She appears well-developed and well-nourished. She is not diaphoretic.   Awake, alert, nontoxic.   HENT:   Head: Normocephalic and atraumatic.   Mouth/Throat: Oropharynx is clear and moist.   Eyes: Conjunctivae and EOM are normal. Pupils are equal, round, and reactive to light.   Neck: Neck supple.   Normal range of motion.  Cardiovascular: Normal rate, regular rhythm, normal heart sounds and intact distal pulses.   No murmur heard.  Accessory beats.   Pulmonary/Chest: Breath sounds normal. No respiratory distress. She has no wheezes. She has no rhonchi. She has no rales.   Abdominal: Abdomen is soft. There is no abdominal tenderness.   No suprapubic TTP. There is no rebound and no guarding.   Musculoskeletal:         General: No tenderness or edema. Normal range of motion.      Cervical back: Normal range of motion and neck supple.      Comments: No back or CVA TTP.     Neurological: She is alert and oriented to person, place, and time. She has normal strength.   Moving all extremities.   Skin: Skin is warm and dry. No erythema. No pallor.   Psychiatric: She has a normal mood and affect.         ED Course   Procedures  Labs Reviewed   COMPREHENSIVE METABOLIC PANEL - Abnormal; Notable for the following components:       Result  Value    Potassium 3.3 (*)     Glucose 160 (*)     All other components within normal limits   URINALYSIS, REFLEX TO URINE CULTURE - Abnormal; Notable for the following components:    Color, UA Colorless (*)     Occult Blood UA Trace (*)     All other components within normal limits    Narrative:     Specimen Source->Urine   CBC W/ AUTO DIFFERENTIAL   B-TYPE NATRIURETIC PEPTIDE   TROPONIN I   TSH   HEMOGLOBIN A1C   SARS-COV-2 RDRP GENE   POCT INFLUENZA A/B MOLECULAR     EKG Readings: (Independently Interpreted)   03:07: NSR, HR 78. L axis. PVCs. No STEMI. Morphology c/w 2/21/22.        Imaging Results           X-Ray Chest AP Portable (Final result)  Result time 05/30/22 04:44:00    Final result by Bella Durbin MD (05/30/22 04:44:00)                 Impression:      Please see above.      Electronically signed by: Bella Durbin MD  Date:    05/30/2022  Time:    04:44             Narrative:    EXAMINATION:  XR CHEST AP PORTABLE    CLINICAL HISTORY:  Other fatigue    TECHNIQUE:  Single frontal view of the chest was performed.    COMPARISON:  02/21/2022, 05/07/2017    FINDINGS:  Cardiac monitoring leads overlie the chest.  There is unchanged prominence of the cardiomediastinal silhouette.  Lungs are symmetrically expanded with questionable increased opacity in the left lower lung zone which could relate to overlying soft tissue although possible component of pleural fluid or airspace disease difficult to definitively exclude.  The right lung appears grossly clear.  No evidence of pneumothorax.  Osseous structures appear intact with degenerative change.    This report was flagged in Epic as abnormal.                              X-Rays:   Independently Interpreted Readings:   Other Readings:  CXR NAD    Medications   sodium chloride 0.9% bolus 1,000 mL (0 mLs Intravenous Stopped 5/30/22 8008)     Medical Decision Making:   History:   Old Medical Records: I decided to obtain old medical records.  Old Records  Summarized: records from clinic visits.  Initial Assessment:   72 yo female with palpitations, dry mouth, increased thirst, and urinary frequency.  Recently on Nitrofurantoin for UTI.  Differential Diagnosis:   Ddx includes UTI, pyelonephritis, undiagnosed DM2, DKA, HHS, dysrhythmia, ACS, adverse reaction to medication, other.   Independently Interpreted Test(s):   I have ordered and independently interpreted X-rays - see prior notes.  I have ordered and independently interpreted EKG Reading(s) - see prior notes  Clinical Tests:   Lab Tests: Reviewed and Ordered  Radiological Study: Ordered and Reviewed  Medical Tests: Ordered and Reviewed  ED Management:  EKG no STEMI. +PVCs.    CXR NAD.    Labs reassuring. Negative troponin x 1 rules out ACS in the setting of symptoms ongoing for days.     Random glucose mildly elevated at 160 but patient states she ate a lot of candy before bed. HgbA1c later resulted at 5.5, normal. No indication to start DM mediation at this time. I doubt this is the cause of polyuria/polydipsia.     No UTI on UA. Patient does not need to continue nitrofurantoin.    I suspect palpitations are related to PVCs noted on EKG and cardiac monitoring. Patient notes her PMD referred her to cardiology and she is supposed to see Dr. Elena at [facility] next month. I have advised her to keep this follow up.    BP elevated here but declined spontaneously. Patient notes she frequently has elevated BP when nervous in healthcare settings.    Overall workup reassuring. Patient felt better after IV fluids. D/c'ed with f/u to PMD and cards.                       Clinical Impression:   Final diagnoses:  [R53.83] Fatigue (Primary)  [R00.2] Palpitations  [R68.2] Dry mouth  [R35.89] Polyuria  [R63.1] Increased thirst  [R73.09] Elevated random blood glucose level  [R03.0] Elevated blood pressure reading  [I49.3] PVC's (premature ventricular contractions)          ED Disposition Condition     Discharge Stable        ED Prescriptions     None        Follow-up Information     Follow up With Specialties Details Why Contact Info    Malaika Coleman MD Internal Medicine   [address] [facility] [place] [address]  [phone]      Nile Elena MD Interventional Cardiology, Cardiology On 6/20/2022  [address]  [address]  [facility] [address]  [phone]             Ana Sinclair MD  05/30/22 1201

## 2022-05-30 NOTE — DISCHARGE INSTRUCTIONS
"Your workup today in the emergency department was reassuring.    Your blood work including blood count, electrolytes, liver and kidney function, thyroid function, and cardiac enzymes is within normal limits.  You do have an elevated random blood glucose.  This could indicate pre diabetes or diabetes.  It is important for you to follow-up with your primary care doctor for further test.  We have sent off an additional test called hemoglobin A1C, which is pending.  Make sure your doctor follows up this test result.    Your urine does not have any evidence of infection.  You do not need to be on antibiotics at this time.    Your COVID test is negative.    Your EKG does not show any evidence of serious heart problems.  You do have a finding called PVCs (premature ventricular contractions).  These occur when a part of your heart beats "extra."  These are not dangerous, but can cause you to feel palpitations.  Follow up to a cardiologist for further evaluation.      Monitor your carbohydrate intake and increase exercise to help control your blood sugar and weight.    Return to the ER for any new/worsening problems.  "

## 2022-09-11 ENCOUNTER — HOSPITAL ENCOUNTER (EMERGENCY)
Facility: HOSPITAL | Age: 72
Discharge: LEFT BEFORE DISCHARGE | End: 2022-09-11
Attending: EMERGENCY MEDICINE
Payer: MEDICARE

## 2022-09-11 VITALS
HEART RATE: 78 BPM | BODY MASS INDEX: 31.71 KG/M2 | OXYGEN SATURATION: 100 % | RESPIRATION RATE: 18 BRPM | DIASTOLIC BLOOD PRESSURE: 88 MMHG | HEIGHT: 67 IN | WEIGHT: 202 LBS | TEMPERATURE: 98 F | SYSTOLIC BLOOD PRESSURE: 170 MMHG

## 2022-09-11 DIAGNOSIS — R10.9 ABDOMINAL PAIN: ICD-10-CM

## 2022-09-11 DIAGNOSIS — R10.11 RUQ PAIN: Primary | ICD-10-CM

## 2022-09-11 DIAGNOSIS — Z53.29 LEFT AGAINST MEDICAL ADVICE: ICD-10-CM

## 2022-09-11 PROBLEM — K21.9 GERD (GASTROESOPHAGEAL REFLUX DISEASE): Status: ACTIVE | Noted: 2020-07-17

## 2022-09-11 PROBLEM — I10 BENIGN ESSENTIAL HTN: Status: ACTIVE | Noted: 2018-11-15

## 2022-09-11 PROBLEM — R00.2 HEART PALPITATIONS: Status: ACTIVE | Noted: 2022-02-22

## 2022-09-11 PROBLEM — E55.9 VITAMIN D DEFICIENCY: Status: ACTIVE | Noted: 2022-02-25

## 2022-09-11 PROBLEM — M54.32 SCIATICA, LEFT SIDE: Status: ACTIVE | Noted: 2020-02-18

## 2022-09-11 PROBLEM — E78.49 OTHER HYPERLIPIDEMIA: Status: ACTIVE | Noted: 2019-12-09

## 2022-09-11 PROCEDURE — 99284 EMERGENCY DEPT VISIT MOD MDM: CPT | Mod: 25

## 2022-09-12 NOTE — ED PROVIDER NOTES
"Encounter Date: 9/11/2022       History     Chief Complaint   Patient presents with    Abdominal Pain     Pt reports to the ED with C/O RUQ ABD pain that started about an hour ago and since has subsided. Pt denies any N/V. Pt reports "it felt like really bad gas." Pt AAOx4, RESP easy and unlabored. Pt awaiting QT bed.      71-year-old female with a history of hypertension, hyperlipidemia, GERD, vitamin-D deficiency, and others presenting for evaluation of right upper quadrant abdominal pain that began about 1 hour ago.  Pain has since resolved.  She states that she is experiencing no symptoms at this time and has no complaints.  She did not taken any medications or attempted any other treatments for her symptoms.  She denies nausea, vomiting, fevers, diarrhea, constipation, URI symptoms, or any others.    Review of patient's allergies indicates:   Allergen Reactions    Codeine Hives     Past Medical History:   Diagnosis Date    Bronchitis     GERD (gastroesophageal reflux disease)     Hypertension      Past Surgical History:   Procedure Laterality Date    TUBAL LIGATION       Family History   Problem Relation Age of Onset    No Known Problems Mother     No Known Problems Father      Social History     Tobacco Use    Smoking status: Never    Smokeless tobacco: Never   Substance Use Topics    Alcohol use: No    Drug use: No     Review of Systems   Constitutional:  Negative for chills, fatigue and fever.   HENT:  Negative for congestion, ear pain, nosebleeds, postnasal drip, rhinorrhea, sinus pressure and sore throat.    Eyes:  Negative for photophobia and pain.   Respiratory:  Negative for apnea, cough, choking, chest tightness, shortness of breath, wheezing and stridor.    Cardiovascular:  Negative for chest pain, palpitations and leg swelling.   Gastrointestinal:  Positive for abdominal pain (resolved). Negative for constipation, diarrhea, nausea and vomiting.   Genitourinary:  Negative for dysuria, flank pain, " hematuria, pelvic pain and vaginal discharge.   Musculoskeletal:  Negative for arthralgias, back pain, gait problem and neck pain.   Skin:  Negative for color change, pallor, rash and wound.   Neurological:  Negative for dizziness, seizures, syncope, facial asymmetry, light-headedness and headaches.   Hematological:  Negative for adenopathy.   Psychiatric/Behavioral:  Negative for confusion. The patient is not nervous/anxious.      Physical Exam     Initial Vitals [09/11/22 1844]   BP Pulse Resp Temp SpO2   (!) 170/88 78 18 98.3 °F (36.8 °C) 100 %      MAP       --         Physical Exam    Nursing note and vitals reviewed.  Constitutional: She appears well-developed and well-nourished. She is not diaphoretic. No distress.   HENT:   Head: Normocephalic and atraumatic.   Right Ear: External ear normal.   Left Ear: External ear normal.   Nose: Nose normal.   Eyes: Conjunctivae and EOM are normal. Right eye exhibits no discharge. Left eye exhibits no discharge.   Neck: Neck supple. No tracheal deviation present.   Normal range of motion.  Cardiovascular:  Normal rate.           Pulmonary/Chest: No stridor. No respiratory distress.   Abdominal: Abdomen is soft. She exhibits no distension. There is no abdominal tenderness.   No abdominal tenderness to palpation.  No rigidity or guarding.  Benign abdominal exam.   Musculoskeletal:         General: No tenderness. Normal range of motion.      Cervical back: Normal range of motion and neck supple.     Neurological: She is alert and oriented to person, place, and time. She has normal strength. No cranial nerve deficit or sensory deficit.   Skin: Skin is warm and dry.   Psychiatric: She has a normal mood and affect. Her behavior is normal. Judgment and thought content normal.       ED Course   Procedures  Labs Reviewed   CBC W/ AUTO DIFFERENTIAL   COMPREHENSIVE METABOLIC PANEL   LIPASE   URINALYSIS, REFLEX TO URINE CULTURE          Imaging Results              US Abdomen  Limited (Gallbladder) (Final result)  Result time 09/11/22 19:22:23      Final result by Deneen Garcia MD (09/11/22 19:22:23)                   Impression:      1. Cholelithiasis and gallbladder sludge.  Common bile duct is dilated measuring 10 mm.  No ultrasonographic evidence to suggest acute cholecystitis.  2. Multiple hepatic cysts.      Electronically signed by: Deneen Garcia MD  Date:    09/11/2022  Time:    19:22               Narrative:    EXAMINATION:  US ABDOMEN LIMITED    CLINICAL HISTORY:  Right upper quadrant pain    TECHNIQUE:  Limited ultrasound of the right upper quadrant of the abdomen was performed.    COMPARISON:  None.    FINDINGS:  Multiple hepatic cysts are visualized, the largest of which measures 7.7 cm in the right hepatic lobe.  Largest left hepatic lobe cyst appears mildly complex and measures 2.7 cm in size.  No intrahepatic biliary ductal dilatation.  Common bile duct is dilated measuring 1.0 cm.  The gallbladder is filled with shadowing stones and sludge.  No evidence of gallbladder wall thickening or pericholecystic fluid.  Sonographic Owen's sign is negative. The visualized portion of the pancreas appears normal.  No evidence of right-sided hydronephrosis.  No ascites.                                       Medications - No data to display  Medical Decision Making:   History:   Old Medical Records: I decided to obtain old medical records.  Clinical Tests:   Lab Tests: Ordered  Radiological Study: Ordered and Reviewed  ED Management:  Ultrasound with evidence of cholelithiasis and gallbladder sludge.  There is common bile duct dilation.  No evidence cholecystitis.  Ordered labs including lipase to rule out pancreatitis.  Also ordered EKG.  Patient is refusing labs and any further interventions.  States that she will follow up with her PCP.  She will sign out AMA.  She expressed understanding of the risks of leaving against medical advice.  She is not clinically intoxicated and is  exhibiting normal reasoning and decision-making abilities.  She signed AMA paperwork prior to leaving.  ED return precautions given.  She expressed understanding.                    Clinical Impression:   Final diagnoses:  [R10.11] RUQ pain (Primary)  [R10.9] Abdominal pain  [Z53.29] Left against medical advice        ED Disposition Condition    THERESA Becker NP  09/11/22 1937

## 2023-03-19 ENCOUNTER — NURSE TRIAGE (OUTPATIENT)
Dept: ADMINISTRATIVE | Facility: CLINIC | Age: 73
End: 2023-03-19
Payer: MEDICARE

## 2023-03-20 NOTE — TELEPHONE ENCOUNTER
Pt states on BP meds, Cozaar BID and Norvasc daily. Pt states she forgot to take the Norvasc today, Current /78. Pt denies any symptoms. Per protocol, urgent treatment with follow up call. Advised pt to take Norvasc now, resume normal schedule tomorrow. Monitor for any worsening symptoms and to expect a call back//follow up call with BP check in 30 mins. Pt verbalizes understanding.   Reason for Disposition   [1] Systolic BP  >= 180 OR Diastolic >= 110 AND [2] missed most recent dose of blood pressure medication    Additional Information   Negative: Difficult to awaken or acting confused (e.g., disoriented, slurred speech)   Negative: SEVERE difficulty breathing (e.g., struggling for each breath, speaks in single words)   Negative: [1] Weakness of the face, arm or leg on one side of the body AND [2] new-onset   Negative: [1] Numbness (i.e., loss of sensation) of the face, arm or leg on one side of the body AND [2] new-onset   Negative: [1] Chest pain lasts > 5 minutes AND [2] history of heart disease (i.e., heart attack, bypass surgery, angina, angioplasty, CHF)   Negative: [1] Chest pain AND [2] took nitrogylcerin AND [3] pain was not relieved   Negative: Sounds like a life-threatening emergency to the triager   Negative: [1] Systolic BP  >= 160 OR Diastolic >= 100 AND [2] cardiac or neurologic symptoms (e.g., chest pain, difficulty breathing, unsteady gait, blurred vision)   Negative: [1] Pregnant 20 or more weeks (or postpartum < 6 weeks) AND [2] new hand or face swelling   Negative: [1] Pregnant 20 or more weeks (or postpartum < 6 weeks) AND [2] Systolic BP >= 160 OR Diastolic >= 100   Negative: [1] Systolic BP  >= 200 OR Diastolic >= 120 AND [2] having NO cardiac or neurologic symptoms   Negative: [1] Pregnant 20 or more weeks (or postpartum < 6 weeks) AND [2] Systolic BP  >= 140 OR Diastolic >= 90    Protocols used[facility]

## 2023-03-20 NOTE — TELEPHONE ENCOUNTER
Follow up BP check from previous call, pt states took Norvasc. Current /80, 66. Pt continues to deny symptoms.  Pt advised to call back for any further questions or concerns. Pt verbalizes understanding.     Reason for Disposition   [1] Follow-up call to recent contact AND [2] information only call, no triage required    Protocols used: Information Only Call-A-

## 2023-03-22 ENCOUNTER — HOSPITAL ENCOUNTER (EMERGENCY)
Facility: HOSPITAL | Age: 73
Discharge: HOME | End: 2023-03-22
Attending: EMERGENCY MEDICINE
Payer: MEDICARE

## 2023-03-22 VITALS
HEIGHT: 67 IN | SYSTOLIC BLOOD PRESSURE: 183 MMHG | RESPIRATION RATE: 18 BRPM | DIASTOLIC BLOOD PRESSURE: 78 MMHG | TEMPERATURE: 99 F | OXYGEN SATURATION: 99 % | WEIGHT: 210 LBS | HEART RATE: 62 BPM | BODY MASS INDEX: 32.96 KG/M2

## 2023-03-22 DIAGNOSIS — R20.2 TINGLING IN EXTREMITIES: Primary | ICD-10-CM

## 2023-03-22 PROCEDURE — 29125 APPL SHORT ARM SPLINT STATIC: CPT | Mod: LT

## 2023-03-22 PROCEDURE — 99281 EMR DPT VST MAYX REQ PHY/QHP: CPT

## 2023-03-22 NOTE — DISCHARGE INSTRUCTIONS
PLEASE FOLLOW-UP WITH YOUR PRIMARY CARE DOCTOR OR AN ORTHOPEDIC SPECIALIST FOR FURTHER EVALUATION AND MANAGEMENT OF THE TINGLING IN YOUR HAND AND FOOT.    TRY TO WEAR THE WRIST SPLINT EVERY NIGHT WHILE YOU ARE ASLEEP.    RETURN TO THE EMERGENCY ROOM FOR ANY NEW OR WORSENING SYMPTOMS OR CONCERNS.

## 2023-03-22 NOTE — ED TRIAGE NOTES
Pt. Reports she was moving a bookcase about 2 weeks ago. Pt. Reports a few days later she started to have numbness in her left 2nd/3rd finger and left great toe. Pt. Reports the sensation is hard to describe because she does not have tingling.

## 2023-03-22 NOTE — ED PROVIDER NOTES
Encounter Date: 3/22/2023    SCRIBE #1 NOTE: I, Jeff Schroeder, am scribing for, and in the presence of,  Mitzy Neumann NP. I have scribed the following portions of the note - Other sections scribed: [id number].     History     Chief Complaint   Patient presents with    Tingling     Pt c/o tingling sensation in left finger tips and toes after bookcase fell on her thigh two weeks ago. Pt denies any trauma to anywhere other than her left thigh. Still has sensation in toes and fingers. Pt blood pressure is 208/86 in triage. Pt states she needs to take her BP medication.     Ching Prather is a 72 y.o. female with a PMHx of HTN, who presents to the ED for evaluation of left first and second digit finger tingling onset two weeks ago. Patient also c/o left great toe and second toe tingling. Patient states a bookshelf fell on her left thigh and she has been noticing the tingling ever since the incident, but is unsure if the symptoms are related. She is able to walk. The tingling only happens in the morning. Denies finger and toe swelling, finger and toe redness, wounds, rash and finger and toe pain.    The history is provided by the patient. No  was used.   Review of patient's allergies indicates:   Allergen Reactions    Codeine Hives     Past Medical History:   Diagnosis Date    Bronchitis     GERD (gastroesophageal reflux disease)     Hypertension      Past Surgical History:   Procedure Laterality Date    TUBAL LIGATION       Family History   Problem Relation Age of Onset    No Known Problems Mother     No Known Problems Father      Social History     Tobacco Use    Smoking status: Never    Smokeless tobacco: Never   Substance Use Topics    Alcohol use: No    Drug use: No     Review of Systems   Constitutional:  Negative for fever.   HENT:  Negative for congestion, sore throat and trouble swallowing.    Respiratory:  Negative for cough and shortness of breath.    Cardiovascular:  Negative for chest pain.  "  Gastrointestinal:  Negative for abdominal pain, constipation, diarrhea, nausea and vomiting.   Genitourinary:  Negative for dysuria, flank pain, frequency and urgency.   Musculoskeletal:  Negative for back pain.        (-) finger and toe swelling (-) finger and toe redness (-)finger and toe pain   Skin:  Negative for rash and wound.   Neurological:  Negative for headaches.        (+) tingling     Physical Exam     Initial Vitals [03/22/23 1145]   BP Pulse Resp Temp SpO2   (!) 208/86 60 18 98.2 °F (36.8 °C) 98 %      MAP       --         Physical Exam    Nursing note and vitals reviewed.  Constitutional: She appears well-developed and well-nourished. She is not diaphoretic. No distress.   Cardiovascular:            Pulses:       Radial pulses are 2+ on the right side and 2+ on the left side.        Dorsalis pedis pulses are 2+ on the left side.   Musculoskeletal:        Legs:       Comments: Left hand and left foot without swelling, tenderness, pain, erythema, wounds. +Phalen's sign to left hand         ED Course   Procedures  Labs Reviewed - No data to display       Imaging Results    None          Medications - No data to display  Medical Decision Making:   History:   Old Medical Records: I decided to obtain old medical records.  Differential Diagnosis:   Nerve impingement, neuropathy, carpal tunnel syndrome  ED Management:  This is an urgent evaluation of a 72-year-old female that presents to the emergency room complaining of tingling sensation in her left hand and left foot for 2 weeks.  Patient reports trauma that occurred around the same time that she 1st started noticing the tingling sensation, though her injury was isolated to the left lateral thigh which has improved over the past 2 weeks.  She reports a book shelf collapsed onto her leg and it is still "sore" when she walks or moves calmed down but she is able to ambulate well on the extremity.  Based on her history and physical exam, I highly doubt a " femur fracture.  This tingling/paresthesias are reportedly affecting only the index and middle finger of the left hand and left great and 2nd toes.  Symptoms are only present in the mornings and typically resolve over the course of the day.  Patient denies any paresthesias on exam, though I was able to elicit a tingling sensation after patient held Phalen sign for approximately 60 seconds.  She is not a diabetic, so I doubt diabetic neuropathy.  She has no focal neurological deficits or vascular deficits.  I am mostly suspicious for carpal tunnel syndrome versus nerve impingement syndrome.  Will refer her to orthopedics or to follow-up with the primary care doctor for EMG study.  Will give wrist splint in the interim and instructed patient to wear it at bedtime to see if symptoms improve.  She was given strict return precautions for any new or worsening symptoms or concerns.        Scribe Attestation:   Scribe #1: I performed the above scribed service and the documentation accurately describes the services I performed. I attest to the accuracy of the note.            I, Mitzy Neumann, personally performed the services described in this documentation.  All medical record entries made by the scribe were at my direction and in my presence.  I have reviewed the chart and agree that the record reflects my personal performance and is accurate and complete.        Clinical Impression:   Final diagnoses:  [R20.2] Tingling in extremities (Primary)        ED Disposition Condition    Discharge Stable          ED Prescriptions    None       Follow-up Information       Follow up With Specialties Details Why Contact Info    Malaika Coleman MD Internal Medicine  As scheduled, for follow up of tingling sensation [address]  [place] [address]  [phone]      Shreyas Dawn MD Orthopedic Surgery  ORTHOPEDICS [address]  [place] [address]  [phone]               Mitzy Neumann NP  03/22/23 4248

## 2024-06-05 ENCOUNTER — HOSPITAL ENCOUNTER (EMERGENCY)
Facility: HOSPITAL | Age: 74
Discharge: HOME | End: 2024-06-05
Attending: EMERGENCY MEDICINE
Payer: MEDICARE

## 2024-06-05 VITALS
TEMPERATURE: 98 F | WEIGHT: 198 LBS | HEART RATE: 90 BPM | RESPIRATION RATE: 20 BRPM | OXYGEN SATURATION: 95 % | HEIGHT: 67 IN | DIASTOLIC BLOOD PRESSURE: 84 MMHG | BODY MASS INDEX: 31.08 KG/M2 | SYSTOLIC BLOOD PRESSURE: 178 MMHG

## 2024-06-05 DIAGNOSIS — K76.89 HEPATIC CYST: ICD-10-CM

## 2024-06-05 DIAGNOSIS — M51.34 THORACIC DEGENERATIVE DISC DISEASE: ICD-10-CM

## 2024-06-05 DIAGNOSIS — K29.70 GASTRITIS, PRESENCE OF BLEEDING UNSPECIFIED, UNSPECIFIED CHRONICITY, UNSPECIFIED GASTRITIS TYPE: Primary | ICD-10-CM

## 2024-06-05 DIAGNOSIS — R07.9 CHEST PAIN: ICD-10-CM

## 2024-06-05 LAB
ALBUMIN SERPL BCP-MCNC: 3.9 G/DL (ref 3.5–5.2)
ALP SERPL-CCNC: 81 U/L (ref 55–135)
ALT SERPL W/O P-5'-P-CCNC: 24 U/L (ref 10–44)
ANION GAP SERPL CALC-SCNC: 12 MMOL/L (ref 8–16)
AST SERPL-CCNC: 33 U/L (ref 10–40)
BASOPHILS # BLD AUTO: 0.03 K/UL (ref 0–0.2)
BASOPHILS NFR BLD: 0.3 % (ref 0–1.9)
BILIRUB SERPL-MCNC: 0.8 MG/DL (ref 0.1–1)
BNP SERPL-MCNC: 47 PG/ML (ref 0–99)
BUN SERPL-MCNC: 11 MG/DL (ref 8–23)
CALCIUM SERPL-MCNC: 9.4 MG/DL (ref 8.7–10.5)
CHLORIDE SERPL-SCNC: 103 MMOL/L (ref 95–110)
CO2 SERPL-SCNC: 23 MMOL/L (ref 23–29)
CREAT SERPL-MCNC: 0.8 MG/DL (ref 0.5–1.4)
D DIMER PPP IA.FEU-MCNC: 0.65 MG/L FEU
DIFFERENTIAL METHOD BLD: ABNORMAL
EOSINOPHIL # BLD AUTO: 0 K/UL (ref 0–0.5)
EOSINOPHIL NFR BLD: 0.3 % (ref 0–8)
ERYTHROCYTE [DISTWIDTH] IN BLOOD BY AUTOMATED COUNT: 13.5 % (ref 11.5–14.5)
EST. GFR  (NO RACE VARIABLE): >60 ML/MIN/1.73 M^2
GLUCOSE SERPL-MCNC: 119 MG/DL (ref 70–110)
HCT VFR BLD AUTO: 44 % (ref 37–48.5)
HGB BLD-MCNC: 15.1 G/DL (ref 12–16)
IMM GRANULOCYTES # BLD AUTO: 0.05 K/UL (ref 0–0.04)
IMM GRANULOCYTES NFR BLD AUTO: 0.4 % (ref 0–0.5)
LIPASE SERPL-CCNC: 22 U/L (ref 4–60)
LYMPHOCYTES # BLD AUTO: 1.7 K/UL (ref 1–4.8)
LYMPHOCYTES NFR BLD: 14.6 % (ref 18–48)
MCH RBC QN AUTO: 27.9 PG (ref 27–31)
MCHC RBC AUTO-ENTMCNC: 34.3 G/DL (ref 32–36)
MCV RBC AUTO: 81 FL (ref 82–98)
MONOCYTES # BLD AUTO: 0.8 K/UL (ref 0.3–1)
MONOCYTES NFR BLD: 6.5 % (ref 4–15)
NEUTROPHILS # BLD AUTO: 9.3 K/UL (ref 1.8–7.7)
NEUTROPHILS NFR BLD: 77.9 % (ref 38–73)
NRBC BLD-RTO: 0 /100 WBC
OHS QRS DURATION: 86 MS
OHS QTC CALCULATION: 445 MS
PLATELET # BLD AUTO: 317 K/UL (ref 150–450)
PMV BLD AUTO: 10.7 FL (ref 9.2–12.9)
POTASSIUM SERPL-SCNC: 3.6 MMOL/L (ref 3.5–5.1)
PROT SERPL-MCNC: 8.3 G/DL (ref 6–8.4)
RBC # BLD AUTO: 5.42 M/UL (ref 4–5.4)
SODIUM SERPL-SCNC: 138 MMOL/L (ref 136–145)
TROPONIN I SERPL DL<=0.01 NG/ML-MCNC: 0.02 NG/ML (ref 0–0.03)
TROPONIN I SERPL DL<=0.01 NG/ML-MCNC: 0.02 NG/ML (ref 0–0.03)
WBC # BLD AUTO: 11.92 K/UL (ref 3.9–12.7)

## 2024-06-05 PROCEDURE — 85025 COMPLETE CBC W/AUTO DIFF WBC: CPT | Performed by: EMERGENCY MEDICINE

## 2024-06-05 PROCEDURE — 84484 ASSAY OF TROPONIN QUANT: CPT | Performed by: EMERGENCY MEDICINE

## 2024-06-05 PROCEDURE — 83880 ASSAY OF NATRIURETIC PEPTIDE: CPT | Performed by: EMERGENCY MEDICINE

## 2024-06-05 PROCEDURE — 93005 ELECTROCARDIOGRAM TRACING: CPT

## 2024-06-05 PROCEDURE — 99285 EMERGENCY DEPT VISIT HI MDM: CPT | Mod: 25

## 2024-06-05 PROCEDURE — 63600175 PHARM REV CODE 636 W HCPCS: Performed by: EMERGENCY MEDICINE

## 2024-06-05 PROCEDURE — 96375 TX/PRO/DX INJ NEW DRUG ADDON: CPT | Mod: 59

## 2024-06-05 PROCEDURE — 25000003 PHARM REV CODE 250: Performed by: EMERGENCY MEDICINE

## 2024-06-05 PROCEDURE — 93010 ELECTROCARDIOGRAM REPORT: CPT | Mod: ,,, | Performed by: INTERNAL MEDICINE

## 2024-06-05 PROCEDURE — 85379 FIBRIN DEGRADATION QUANT: CPT | Performed by: EMERGENCY MEDICINE

## 2024-06-05 PROCEDURE — 96374 THER/PROPH/DIAG INJ IV PUSH: CPT | Mod: 59

## 2024-06-05 PROCEDURE — 25500020 PHARM REV CODE 255: Performed by: EMERGENCY MEDICINE

## 2024-06-05 PROCEDURE — 83690 ASSAY OF LIPASE: CPT | Performed by: EMERGENCY MEDICINE

## 2024-06-05 PROCEDURE — 80053 COMPREHEN METABOLIC PANEL: CPT | Performed by: EMERGENCY MEDICINE

## 2024-06-05 RX ORDER — SUCRALFATE 1 G/10ML
1 SUSPENSION ORAL
Qty: 414 ML | Refills: 0 | Status: SHIPPED | OUTPATIENT
Start: 2024-06-05

## 2024-06-05 RX ORDER — ALUMINUM HYDROXIDE, MAGNESIUM HYDROXIDE, AND SIMETHICONE 1200; 120; 1200 MG/30ML; MG/30ML; MG/30ML
30 SUSPENSION ORAL ONCE
Status: COMPLETED | OUTPATIENT
Start: 2024-06-05 | End: 2024-06-05

## 2024-06-05 RX ORDER — ONDANSETRON 4 MG/1
4 TABLET, ORALLY DISINTEGRATING ORAL EVERY 6 HOURS PRN
Qty: 20 TABLET | Refills: 0 | Status: SHIPPED | OUTPATIENT
Start: 2024-06-05

## 2024-06-05 RX ORDER — ONDANSETRON HYDROCHLORIDE 2 MG/ML
4 INJECTION, SOLUTION INTRAVENOUS
Status: COMPLETED | OUTPATIENT
Start: 2024-06-05 | End: 2024-06-05

## 2024-06-05 RX ORDER — LIDOCAINE HYDROCHLORIDE 20 MG/ML
15 SOLUTION OROPHARYNGEAL ONCE
Status: COMPLETED | OUTPATIENT
Start: 2024-06-05 | End: 2024-06-05

## 2024-06-05 RX ORDER — FAMOTIDINE 10 MG/ML
20 INJECTION INTRAVENOUS
Status: COMPLETED | OUTPATIENT
Start: 2024-06-05 | End: 2024-06-05

## 2024-06-05 RX ORDER — HYDROCODONE BITARTRATE AND ACETAMINOPHEN 5; 325 MG/1; MG/1
1 TABLET ORAL EVERY 8 HOURS PRN
Qty: 10 TABLET | Refills: 0 | Status: SHIPPED | OUTPATIENT
Start: 2024-06-05

## 2024-06-05 RX ADMIN — LIDOCAINE HYDROCHLORIDE 15 ML: 20 SOLUTION ORAL at 04:06

## 2024-06-05 RX ADMIN — FAMOTIDINE 20 MG: 10 INJECTION, SOLUTION INTRAVENOUS at 01:06

## 2024-06-05 RX ADMIN — ALUMINUM HYDROXIDE, MAGNESIUM HYDROXIDE, AND SIMETHICONE 30 ML: 200; 200; 20 SUSPENSION ORAL at 04:06

## 2024-06-05 RX ADMIN — ONDANSETRON 4 MG: 2 INJECTION INTRAMUSCULAR; INTRAVENOUS at 01:06

## 2024-06-05 RX ADMIN — IOHEXOL 100 ML: 350 INJECTION, SOLUTION INTRAVENOUS at 03:06

## 2024-06-05 NOTE — ED NOTES
Pt told Dr Diaz that  she took her home Norvasc on her own around 1600.  Pt instructed to not take anymore medications on her own while at the hospital.  Pt verbalized understanding.

## 2024-06-05 NOTE — DISCHARGE INSTRUCTIONS
Exact cause of your symptoms is unclear at this time.  You are most likely related to acid reflux/gastritis however they may also be related to arthritis in your thoracic spine.  Emergency department testing is within acceptable ranges.  You have been offered placement in observation but have elected to be discharged to follow up with Gastroenterology and Cardiology as an outpatient.  Continue your medications as you have been prescribed including Protonix.  Use Flexeril as needed for back pain, use Zofran as needed for nausea or vomiting.  Use Carafate for persistent GI symptoms.  Use Norco as needed for severe pain.  Use Norco cautiously as it can cause drowsiness and decrease coordination.  Schedule close follow-up with your primary physician as well as Gastroenterology and Cardiology.  Return to the emergency department for any new, worsening or significantly concerning symptoms.    Thank you for coming to our Emergency Department today. It is important to remember that some problems are difficult to diagnose and may not be found during your first visit. Be sure to follow up with your primary care doctor and review any labs/imaging that was performed with them. If you do not have a primary care doctor, you may contact the one listed on your discharge paperwork or you may also call the [facility] Appointment Desk at [phone] to schedule an appointment with one.     All medications may potentially have side effects and it is impossible to predict which medications may give you side effects. If you feel that you are having a negative effect of any medication you should immediately stop taking them and seek medical attention.    Return to the ER with any questions/concerns, new/concerning symptoms, worsening or failure to improve. Do not drive or make any important decisions for 24 hours if you have received any pain medications, sedatives or mood altering drugs during your ER visit.

## 2024-06-05 NOTE — ED NOTES
"    72 yo female PMHX of HTN, GERD to ED with intermittent upper back pain x 4 days. Pt states today that it has radiated around L side to epigastric CP. Pt states she has been nausea and "gassy". Pt states she used to take protonix but stopped x 2 years and was told by her PCP that she may have sympotoms like this when she stops. Pt denies SOB, V/D, weakness, numbness/tingling, cough, and/or fevers. VSS, AAOx4, NAD. Pt has been placed on all cardiac monitoring, call light in reach, bed in lowest position, side rails raised x 2, and instructed to call staff for assistance whenever needed.   "

## 2024-06-05 NOTE — ED PROVIDER NOTES
"Encounter Date: 6/5/2024    SCRIBE #1 NOTE: I, Hannah Torrez, am scribing for, and in the presence of,  Bertin Diaz MD.       History     Chief Complaint   Patient presents with    Chest Pain    Back Pain     Pt c/o midsternal Chest pain starting this morning. States "it feels like gas" pt also reports back pain x 4 days. Took Aleve this morning at 0930. Denies any other associated symptoms      Ching Prather is a 73 y.o. female, with a PMHx of HTN, GERD, who presents to the ED with intermittent upper back pain x4 days. Patient reports today pain began radiating to her chest. Reports associated decreased appetite, nausea and vomiting. States she feels "gassy" but denies passing flatus. Also reports constipation, LBM 6 days ago. Reports she has not taken her protonix in 2 years. Reports PSHx of tubal ligation. Reports she recently drove to [place], 8 hours round trip. No other exacerbating or alleviating factors. Denies dysuria, hematuria, difficulty urinating, frequency, urine decreased or other associated symptoms. Reports allergies to codeine and naproxen.      The history is provided by the patient. No  was used.     Review of patient's allergies indicates:   Allergen Reactions    Codeine Hives     Past Medical History:   Diagnosis Date    Bronchitis     GERD (gastroesophageal reflux disease)     Hypertension      Past Surgical History:   Procedure Laterality Date    TUBAL LIGATION       Family History   Problem Relation Name Age of Onset    No Known Problems Mother      No Known Problems Father       Social History     Tobacco Use    Smoking status: Never    Smokeless tobacco: Never   Substance Use Topics    Alcohol use: No    Drug use: No     Review of Systems   Constitutional:  Positive for appetite change (<). Negative for chills and fever.   HENT:  Negative for congestion and sore throat.    Eyes:  Negative for visual disturbance.   Respiratory:  Negative for cough and shortness of " breath.    Cardiovascular:  Positive for chest pain.   Gastrointestinal:  Positive for constipation, nausea and vomiting.        (-) passing flatus   Genitourinary:  Negative for dysuria and vaginal discharge.   Musculoskeletal:  Positive for back pain.   Skin:  Negative for rash.   Neurological:  Negative for headaches.   Psychiatric/Behavioral:  Negative for decreased concentration.        Physical Exam     Initial Vitals [06/05/24 1214]   BP Pulse Resp Temp SpO2   (!) 148/71 86 16 98.1 °F (36.7 °C) 95 %      MAP       --         Physical Exam    Nursing note and vitals reviewed.  Constitutional: She is not diaphoretic. No distress.   HENT:   Head: Normocephalic and atraumatic.   Protecting airway   Eyes: Conjunctivae and EOM are normal. No scleral icterus.   Neck: Neck supple. No tracheal deviation present.   Normal range of motion.  Cardiovascular:  Normal rate, regular rhythm and intact distal pulses.           Pulmonary/Chest: No stridor. No respiratory distress. She has no rhonchi.   Speaking in full sentences   Abdominal: Abdomen is soft. Bowel sounds are normal. She exhibits no distension. There is no abdominal tenderness.   Negative griffith sign   No right CVA tenderness.  No left CVA tenderness. There is no rebound and no guarding.   Musculoskeletal:         General: No tenderness or edema.      Cervical back: Normal range of motion and neck supple.     Neurological: She is alert. She has normal strength. No cranial nerve deficit or sensory deficit.   Skin: Skin is warm and dry.   Psychiatric: She has a normal mood and affect.         ED Course   Procedures  Labs Reviewed   CBC W/ AUTO DIFFERENTIAL - Abnormal; Notable for the following components:       Result Value    RBC 5.42 (*)     MCV 81 (*)     Gran # (ANC) 9.3 (*)     Immature Grans (Abs) 0.05 (*)     Gran % 77.9 (*)     Lymph % 14.6 (*)     All other components within normal limits   COMPREHENSIVE METABOLIC PANEL - Abnormal; Notable for the  following components:    Glucose 119 (*)     All other components within normal limits   D DIMER, QUANTITATIVE - Abnormal; Notable for the following components:    D-Dimer 0.65 (*)     All other components within normal limits   TROPONIN I   TROPONIN I   B-TYPE NATRIURETIC PEPTIDE   LIPASE     EKG Readings: (Independently Interpreted)   Initial Reading: No STEMI. Rhythm: Normal Sinus Rhythm. Heart Rate: 90. Ectopy: No Ectopy. T Waves: Normal. Axis: Normal.   Nonspecific ST abnormality       Imaging Results               CTA Chest Abdomen Pelvis (Final result)  Result time 06/05/24 15:34:41      Final result by Ulysses Amado MD (06/05/24 15:34:41)                   Impression:      This report was flagged in Epic as abnormal.    1. No findings to suggest aortic aneurysm or dissection as clinically question.  2. Patchy foci of ground-glass attenuation within the right upper lobe, noting scattered tree-in-bud type nodularity, overall concerning for developing infectious or inflammatory process, correlation is advised.  3. Pulmonary nodule within the left lower lobe.  Comparison with any previous exams would be helpful.  For a solid nodule <6 mm, Fleischner Society 2017 guidelines recommend no routine follow up for a low risk patient, or follow-up with non-contrast chest CT at 12 months in a high risk patient.  4. Multiple low attenuating lesions throughout the hepatic parenchyma, the majority of which measure attenuation suggesting cysts however 1 of which measures attenuation higher than would be expected for a simple cyst within the right hepatic lobe.  Nonemergent MRI could be performed for further evaluation as clinically warranted.  Please see above for full details regarding the hepatic parenchyma.  5. Cholelithiasis/sludge, no secondary findings to suggest acute cholecystitis.  6. Possible hepatic steatosis, correlation with LFTs recommended.  7. Please see above for several additional  findings.      Electronically signed by: Ulysses Amado MD  Date:    06/05/2024  Time:    15:34               Narrative:    EXAMINATION:  CTA CHEST ABDOMEN PELVIS    CLINICAL HISTORY:  Aortic dissection suspected;    TECHNIQUE:  Axial images of the chest abdomen and pelvis were obtained at 1.25 mm intervals following administration of 100 cc omni 350 IV contrast following the CTA chest abdomen and pelvis protocol.  Coronal and sagittal reformatted images were reviewed.    COMPARISON:  Abdominal ultrasound 09/11/2022    FINDINGS:  The structures at the base of the neck are remarkable for a few punctate thyroid nodules, nonemergent ultrasound could be performed as warranted if not previously performed.  No significant mediastinal lymphadenopathy.  The heart is not enlarged.    The airways are patent.  There is biapical atelectasis/scarring.  There is a vague ground-glass focus within the right upper lobe measuring approximately 8-9 mm.  Additional tree-in-bud type nodularity noted along the anterior aspect of the fissure within the right upper lobe.  There is bilateral basilar dependent atelectasis.  No large focal consolidation.  No pneumothorax.  No pleural effusion.  There is a 3-4 mm pulmonary nodule within the posterior aspect of the left lower lobe, series 2, image 278.    Allowing for bolus timing, the liver, pancreas and adrenal glands are grossly unremarkable.  There is cholelithiasis/sludge, no secondary findings to suggest acute cholecystitis.  The stomach is decompressed without wall thickening.  There are numerous low attenuating lesions throughout the hepatic parenchyma, largest within the left lobe laterally measures 6.3 cm.  Largest within the medial aspect of the right hepatic lobe measures 3.3 cm.  The majority of the lesions measure attenuation suggesting cysts or are too small for characterization.  The lesion within the medial aspect of the right hepatic lobe measures attenuation higher than  would be expected for a simple cyst.  Heterogeneous enhancement of the hepatic parenchyma may reflect underlying geographic steatosis.  There is a vague focus of arterial enhancement along the medial aspect of the right hepatic lobe near the dome measuring 2 cm, possibly reflecting that of flash filling hemangioma.  Comparison with any previous examinations would be helpful.  The portal vein and splenic vein are patent.  No significant abdominal lymphadenopathy.    The kidneys enhance symmetrically without hydronephrosis or nephrolithiasis.  Low attenuating lesions arise from the kidneys bilaterally, too small for characterization.  The bilateral ureters are unremarkable without calculi seen.  The urinary bladder is unremarkable.  The uterus and right adnexa are unremarkable.  There are prominent vessels about the left ovary/left adnexa, correlation with any history of pelvic congestion.    There are multiple scattered colonic diverticula without inflammation to suggest diverticulitis.  The terminal ileum and appendix are unremarkable.  The small bowel is grossly unremarkable.  There are a few scattered shotty periaortic, pericaval, and mesenteric lymph nodes.  No focal organized pelvic fluid collection.    There is osteopenia.  There are degenerative changes of the bilateral sacroiliac joints, pubic symphysis, and spine.  No significant inguinal lymphadenopathy.  No significant axillary lymphadenopathy.    Vascular details: There is a 2 vessel arch.  The proximal arch vessels are patent.  There is atherosclerotic calcification of the aorta and its branches.  The thoracic aorta tapers normally.  Allowing for bolus timing, the celiac axis, SMA, bilateral renal arteries, ISIDRO, and distal aorto iliac branches all are patent.  No aneurysm or dissection.                                       X-Ray Chest AP Portable (Final result)  Result time 06/05/24 13:08:33      Final result by Ulysses Amado MD (06/05/24  13:08:33)                   Impression:      1. Questionable left pleural effusion, no large focal consolidation.      Electronically signed by: Ulysses Amado MD  Date:    06/05/2024  Time:    13:08               Narrative:    EXAMINATION:  XR CHEST AP PORTABLE    CLINICAL HISTORY:  Chest Pain;    TECHNIQUE:  Single frontal view of the chest was performed.    COMPARISON:  05/30/2022    FINDINGS:  The cardiomediastinal silhouette is not enlarged noting calcification of the aorta..  There is obscuration of the left costophrenic angle, may reflect small effusion or more likely related to overlying habitus..  The trachea is midline.  The lungs are symmetrically expanded bilaterally without evidence of acute parenchymal process. No large focal consolidation seen.  There is no pneumothorax.  The osseous structures are remarkable for degenerative change..                                       Medications   ondansetron injection 4 mg (4 mg Intravenous Given 6/5/24 1346)   famotidine (PF) injection 20 mg (20 mg Intravenous Given 6/5/24 1346)   iohexoL (OMNIPAQUE 350) injection 100 mL (100 mLs Intravenous Given 6/5/24 1506)   aluminum-magnesium hydroxide-simethicone 200-200-20 mg/5 mL suspension 30 mL (30 mLs Oral Given 6/5/24 1638)     And   LIDOcaine viscous HCl 2% oral solution 15 mL (15 mLs Oral Given 6/5/24 1638)     Medical Decision Making  Differential diagnosis include but are not limited to: GERD, gastritis, gastroenteritis, pancreatitis, bowel obstruction, ACS, aortic dissection    Patient is afebrile and in no acute distress at time history and physical.  She does not have an acute surgical abdomen.  CBC without leukocytosis or anemia.  Chemistry without renal failure or significant electrolyte abnormality.  BNP, troponin within normal ranges.  D-dimer elevated.  Chest x-ray with questionable left pleural effusion.  Given elevated D-dimer, chest pain/back pain patient is sent for CTA of the chest/abdomen/pelvis  which does not have findings to suggest aortic aneurysm or dissection.  There is note of ground-glass attenuation in the right upper lobe concerning for developing infectious or inflammatory process.  Patient has no leukocytosis, denies cough or shortness of breath.  There is additionally note of a left lower lobe pulmonary nodule.  There are cysts in the liver as well as cholelithiasis as sludge without secondary findings to suggest acute cholecystitis.  Past ultrasounds have demonstrated similar findings.  Patient informed of cysts and nodules and imaging findings in the importance of close follow up of these findings with primary care physician as well as Gastroenterology.  On reassessment patient reports persistent symptoms.  Patient given GI cocktail    Amount and/or Complexity of Data Reviewed  Labs: ordered. Decision-making details documented in ED Course.  Radiology: ordered. Decision-making details documented in ED Course.  ECG/medicine tests: ordered and independent interpretation performed. Decision-making details documented in ED Course.    Risk  OTC drugs.  Prescription drug management.      Additional MDM:   Heart Score:    History:          Slightly suspicious.  ECG:             Nonspecific repolarisation disturbance  Age:               >65 years  Risk factors: 1-2 risk factors  Troponin:       Less than or equal to normal limit  Heart Score = 4             Scribe Attestation:   Scribe #1: I performed the above scribed service and the documentation accurately describes the services I performed. I attest to the accuracy of the note.              This chart was completed using dictation software, as a result there may be some transcription errors.                I, Bertin Diaz , personally performed the services described in this documentation.  All medical record entries made by the scribe were at my direction and in my presence.  I have reviewed the chart and agree that the record reflects my  personal performance and is accurate and complete.    Clinical Impression:  Final diagnoses:  [R07.9] Chest pain  [K29.70] Gastritis, presence of bleeding unspecified, unspecified chronicity, unspecified gastritis type (Primary)  [M51.34] Thoracic degenerative disc disease  [K76.89] Hepatic cyst          ED Disposition Condition    Discharge Stable          ED Prescriptions       Medication Sig Dispense Start Date End Date Auth. Provider    sucralfate (CARAFATE) 100 mg/mL suspension Take 10 mLs (1 g total) by mouth 4 (four) times daily before meals and nightly. 414 mL 6/5/2024 -- Bertin Diaz MD    ondansetron (ZOFRAN-ODT) 4 MG TbDL Take 1 tablet (4 mg total) by mouth every 6 (six) hours as needed. 20 tablet 6/5/2024 -- Bertin Diaz MD    HYDROcodone-acetaminophen (NORCO) 5-325 mg per tablet Take 1 tablet by mouth every 8 (eight) hours as needed (Severe pain not improved by other medications). 10 tablet 6/5/2024 -- Bertin Diaz MD          Follow-up Information       Follow up With Specialties Details Why Contact Info Additional Information    Malaika Coleman MD Internal Medicine Schedule an appointment as soon as possible for a visit   [address] [facility] [address] [phone]       [facility] Cardiology Schedule an appointment as soon as possible for a visit   [address]  [facility] [address] [phone] Please park in garage or Medical Office Bldg. surface lot and use Medical Ofc Bldg elevator. Check in at [facility] [address].    [facility] Gastroenterology Schedule an appointment as soon as possible for a visit   [address]  [address]  [facility] [address] [phone] Please park in garage or surface lot and use Medical Office Bldg entrance. Check in at [address]             Bertin Diaz MD  06/05/24 8266

## 2025-03-13 ENCOUNTER — HOSPITAL ENCOUNTER (EMERGENCY)
Facility: HOSPITAL | Age: 75
Discharge: HOME | End: 2025-03-13
Attending: STUDENT IN AN ORGANIZED HEALTH CARE EDUCATION/TRAINING PROGRAM
Payer: OTHER GOVERNMENT

## 2025-03-13 VITALS
HEIGHT: 67 IN | HEART RATE: 65 BPM | RESPIRATION RATE: 23 BRPM | OXYGEN SATURATION: 99 % | SYSTOLIC BLOOD PRESSURE: 166 MMHG | BODY MASS INDEX: 31.08 KG/M2 | TEMPERATURE: 98 F | DIASTOLIC BLOOD PRESSURE: 76 MMHG | WEIGHT: 198 LBS

## 2025-03-13 DIAGNOSIS — R41.82 AMS (ALTERED MENTAL STATUS): ICD-10-CM

## 2025-03-13 LAB
ALBUMIN SERPL BCP-MCNC: 3.8 G/DL (ref 3.5–5.2)
ALP SERPL-CCNC: 73 U/L (ref 40–150)
ALT SERPL W/O P-5'-P-CCNC: 16 U/L (ref 10–44)
ANION GAP SERPL CALC-SCNC: 9 MMOL/L (ref 8–16)
AST SERPL-CCNC: 19 U/L (ref 10–40)
BASOPHILS # BLD AUTO: 0.04 K/UL (ref 0–0.2)
BASOPHILS NFR BLD: 0.5 % (ref 0–1.9)
BILIRUB SERPL-MCNC: 0.4 MG/DL (ref 0.1–1)
BUN SERPL-MCNC: 10 MG/DL (ref 8–23)
CALCIUM SERPL-MCNC: 8.9 MG/DL (ref 8.7–10.5)
CHLORIDE SERPL-SCNC: 110 MMOL/L (ref 95–110)
CHOLEST SERPL-MCNC: 224 MG/DL (ref 120–199)
CHOLEST/HDLC SERPL: 4.2 {RATIO} (ref 2–5)
CO2 SERPL-SCNC: 23 MMOL/L (ref 23–29)
CREAT SERPL-MCNC: 0.8 MG/DL (ref 0.5–1.4)
DIFFERENTIAL METHOD BLD: NORMAL
EOSINOPHIL # BLD AUTO: 0.1 K/UL (ref 0–0.5)
EOSINOPHIL NFR BLD: 1.4 % (ref 0–8)
ERYTHROCYTE [DISTWIDTH] IN BLOOD BY AUTOMATED COUNT: 13.3 % (ref 11.5–14.5)
EST. GFR  (NO RACE VARIABLE): >60 ML/MIN/1.73 M^2
GLUCOSE SERPL-MCNC: 96 MG/DL (ref 70–110)
HCT VFR BLD AUTO: 41.7 % (ref 37–48.5)
HDLC SERPL-MCNC: 53 MG/DL (ref 40–75)
HDLC SERPL: 23.7 % (ref 20–50)
HGB BLD-MCNC: 14.1 G/DL (ref 12–16)
IMM GRANULOCYTES # BLD AUTO: 0.02 K/UL (ref 0–0.04)
IMM GRANULOCYTES NFR BLD AUTO: 0.3 % (ref 0–0.5)
INR PPP: 1 (ref 0.8–1.2)
LDLC SERPL CALC-MCNC: 137.4 MG/DL (ref 63–159)
LYMPHOCYTES # BLD AUTO: 2 K/UL (ref 1–4.8)
LYMPHOCYTES NFR BLD: 25.7 % (ref 18–48)
MCH RBC QN AUTO: 28.7 PG (ref 27–31)
MCHC RBC AUTO-ENTMCNC: 33.8 G/DL (ref 32–36)
MCV RBC AUTO: 85 FL (ref 82–98)
MONOCYTES # BLD AUTO: 0.5 K/UL (ref 0.3–1)
MONOCYTES NFR BLD: 6.8 % (ref 4–15)
NEUTROPHILS # BLD AUTO: 5.1 K/UL (ref 1.8–7.7)
NEUTROPHILS NFR BLD: 65.3 % (ref 38–73)
NONHDLC SERPL-MCNC: 171 MG/DL
NRBC BLD-RTO: 0 /100 WBC
PLATELET # BLD AUTO: 255 K/UL (ref 150–450)
PMV BLD AUTO: 11.2 FL (ref 9.2–12.9)
POCT GLUCOSE: 100 MG/DL (ref 70–110)
POTASSIUM SERPL-SCNC: 3.2 MMOL/L (ref 3.5–5.1)
PROT SERPL-MCNC: 7.7 G/DL (ref 6–8.4)
PROTHROMBIN TIME: 10.9 SEC (ref 9–12.5)
RBC # BLD AUTO: 4.91 M/UL (ref 4–5.4)
SODIUM SERPL-SCNC: 142 MMOL/L (ref 136–145)
TRIGL SERPL-MCNC: 168 MG/DL (ref 30–150)
TROPONIN I SERPL DL<=0.01 NG/ML-MCNC: <0.006 NG/ML (ref 0–0.03)
TSH SERPL DL<=0.005 MIU/L-ACNC: 1.75 UIU/ML (ref 0.4–4)
WBC # BLD AUTO: 7.74 K/UL (ref 3.9–12.7)

## 2025-03-13 PROCEDURE — 99285 EMERGENCY DEPT VISIT HI MDM: CPT | Mod: 25

## 2025-03-13 PROCEDURE — 85610 PROTHROMBIN TIME: CPT | Performed by: PHYSICIAN ASSISTANT

## 2025-03-13 PROCEDURE — 93010 ELECTROCARDIOGRAM REPORT: CPT | Mod: ,,, | Performed by: INTERNAL MEDICINE

## 2025-03-13 PROCEDURE — 82962 GLUCOSE BLOOD TEST: CPT

## 2025-03-13 PROCEDURE — 85025 COMPLETE CBC W/AUTO DIFF WBC: CPT | Performed by: PHYSICIAN ASSISTANT

## 2025-03-13 PROCEDURE — 93005 ELECTROCARDIOGRAM TRACING: CPT

## 2025-03-13 PROCEDURE — 84443 ASSAY THYROID STIM HORMONE: CPT | Performed by: PHYSICIAN ASSISTANT

## 2025-03-13 PROCEDURE — 25000003 PHARM REV CODE 250

## 2025-03-13 PROCEDURE — 80061 LIPID PANEL: CPT | Performed by: PHYSICIAN ASSISTANT

## 2025-03-13 PROCEDURE — 80053 COMPREHEN METABOLIC PANEL: CPT | Performed by: PHYSICIAN ASSISTANT

## 2025-03-13 PROCEDURE — 84484 ASSAY OF TROPONIN QUANT: CPT | Performed by: PHYSICIAN ASSISTANT

## 2025-03-13 RX ORDER — LOSARTAN POTASSIUM 25 MG/1
25 TABLET ORAL
Status: COMPLETED | OUTPATIENT
Start: 2025-03-13 | End: 2025-03-13

## 2025-03-13 RX ORDER — LOSARTAN POTASSIUM 25 MG/1
50 TABLET ORAL DAILY
Status: DISCONTINUED | OUTPATIENT
Start: 2025-03-14 | End: 2025-03-13

## 2025-03-13 RX ADMIN — LOSARTAN POTASSIUM 25 MG: 25 TABLET, FILM COATED ORAL at 09:03

## 2025-03-13 NOTE — ED TRIAGE NOTES
Pt to ED with daughter c/o blooded vision, unable to think of daughters phone number, unable to write her blood pressure reading down that lasted for about 20 mins, at approx 1730 this evening, then all symptoms subsided. Reports off and on HA that began on today. Reports blood pressure was elevated yesterday but even higher today.

## 2025-03-14 NOTE — DISCHARGE INSTRUCTIONS
You were seen in the emergency department for your confusion.  Your workup and imaging were reassuring that nothing emergent was going on.  Please continue to take your medications as prescribed.  Please follow up with the primary care physician in 3-5 days to ensure your symptoms have improved.  Please return to the emergency department if your symptoms worsen or change.

## 2025-03-14 NOTE — ED PROVIDER NOTES
Encounter Date: 3/13/2025       History     Chief Complaint   Patient presents with    Hypertension     Pt arrived in ED, c/o HTN and intermittent HA. Pt reports having a moment of confusion and the inability to focus approximately 90 minutes ago. Pt reports episode and HA have subsided. Pt denies any CP, SOB, abd pain or n/v/d. Provider assessed pt in triage and ruled no stroke activation.      Patient is a 74-year-old female with a past medical history of hypertension, hyperlipidemia that presents to the emergency department for confusion.  The patient states that an hour prior to arrival she developed blurry vision.  She states that she then took her blood pressure and saw that it was 166/74 and tried to write down that number.  She was unable to focus and right the numbers.  During this episode she also was having difficulties word-finding and did not remember her daughter's number which was abnormal for her.  She denies any traumas, recent illnesses, fevers, chest pain, shortness of breath      Review of patient's allergies indicates:   Allergen Reactions    Codeine Hives     Past Medical History:   Diagnosis Date    Bronchitis     GERD (gastroesophageal reflux disease)     Hypertension      Past Surgical History:   Procedure Laterality Date    TUBAL LIGATION       Family History   Problem Relation Name Age of Onset    No Known Problems Mother      No Known Problems Father       Social History[1]  Review of Systems    Physical Exam     Initial Vitals [03/13/25 1834]   BP Pulse Resp Temp SpO2   (!) 195/92 78 16 97.8 °F (36.6 °C) 99 %      MAP       --         Physical Exam  Gen: AxOx3, well nourished, appears stated age, no pallor, no jaundice, appears well hydrated  Eye: EOMI, no scleral icterus, no periorbital edema or ecchymosis  Head: Normocephalic, atraumatic, no lesions, scalp appears normal  ENT: Neck supple, no stridor, no masses, no drooling or voice changes  CVS: All distal pulses intact with  normal rate and rhythm, no JVD, normal S1/S2, no murmur  Pulm: Normal breath sounds, no wheezes, rales or rhonchi, no increased work of breathing  Abd:  Nondistended, soft, nontender, no organomegaly, no CVAT  Ext: No edema, no lesions, rashes, or deformity  Neuro: GCS15, moving all extremities, gait intact, face grossly symmetric  Psych: normal affect, cooperative, well groomed, makes good eye contact   ED Course   Procedures  Labs Reviewed   COMPREHENSIVE METABOLIC PANEL - Abnormal       Result Value    Sodium 142      Potassium 3.2 (*)     Chloride 110      CO2 23      Glucose 96      BUN 10      Creatinine 0.8      Calcium 8.9      Total Protein 7.7      Albumin 3.8      Total Bilirubin 0.4      Alkaline Phosphatase 73      AST 19      ALT 16      eGFR >60      Anion Gap 9     LIPID PANEL - Abnormal    Cholesterol 224 (*)     Triglycerides 168 (*)     HDL 53      LDL Cholesterol 137.4      HDL/Cholesterol Ratio 23.7      Total Cholesterol/HDL Ratio 4.2      Non-HDL Cholesterol 171     CBC W/ AUTO DIFFERENTIAL    WBC 7.74      RBC 4.91      Hemoglobin 14.1      Hematocrit 41.7      MCV 85      MCH 28.7      MCHC 33.8      RDW 13.3      Platelets 255      MPV 11.2      Immature Granulocytes 0.3      Gran # (ANC) 5.1      Immature Grans (Abs) 0.02      Lymph # 2.0      Mono # 0.5      Eos # 0.1      Baso # 0.04      nRBC 0      Gran % 65.3      Lymph % 25.7      Mono % 6.8      Eosinophil % 1.4      Basophil % 0.5      Differential Method Automated     PROTIME-INR    Prothrombin Time 10.9      INR 1.0     TSH    TSH 1.751     TROPONIN I    Troponin I <0.006     POCT GLUCOSE    POCT Glucose 100            Imaging Results              CT Head Without Contrast (Final result)  Result time 03/13/25 21:11:49      Final result by Rose Mary Wilson MD (03/13/25 21:11:49)                   Impression:      No acute intracranial abnormality detected.      Electronically signed by: Rose Mary  Steve  Date:    03/13/2025  Time:    21:11               Narrative:    EXAMINATION:  CT OF THE HEAD WITHOUT    CLINICAL HISTORY:  Mental status change, unknown cause;    TECHNIQUE:  5 mm unenhanced axial images were obtained from the skull base to the vertex.    COMPARISON:  02/21/2022    FINDINGS:  The ventricles, basal cisterns, and cortical sulci are within normal limits for patient's stated age. There is no acute intracranial hemorrhage, territorial infarct or mass effect, or midline shift. The visualized paranasal sinuses and mastoid air cells are clear.                                       Medications   iohexoL (OMNIPAQUE 350) injection 100 mL (has no administration in time range)   losartan tablet 25 mg (25 mg Oral Given 3/13/25 2111)     Medical Decision Making  Ching Prather is a 74 y.o. female presenting to the ED with brief altered mental status. History and physical exam as above. Initial vital signs stable and non-actionable. Initial work-up to include:  See above    Differential diagnosis considered for this patient includes, but is not limited to:  Hypertensive urgency versus emergency, TIA, electrolyte abnormalities.  Other severe, more emergent diagnoses considered, but deemed much less likely, to include:  ACS, CHF, stroke, intracranial hemorrhage    Workup largely unremarkable.  Symptoms have completely improved since then.  Plan for patient will be to discharge to home with ED return precautions if head CT is negative.    Amount and/or Complexity of Data Reviewed  Labs:  Decision-making details documented in ED Course.  Radiology: ordered. Decision-making details documented in ED Course.    Risk  Prescription drug management.              Attending Attestation:             Attending ED Notes:   I have reviewed the case with my resident and agree with the history, review of systems, physical exam, assessment, and plan of care as documented. I have also physically saw the patient and performed  an independent HPI and exam. I was immediately available for any procedures.  In short, the patient presented 2/2 confusion that occurred an hour prior to ED arrival. She reports she initially noted blurry vision then elevated blood pressure, 166/74. She notes difficulty writing numbers at that time. No similar symptoms in the past. No attempt at treatment prior to ED arrival. BP in triage 195/92. Remainder of vitals reassuring. Given history and presentation, with a benign exam, work-up obtained. Labs reveal a CMP with hypokalemia, which is new when compared to labs from June 2024. Minimal elevated cholesterol and triglyceride. Imaging and EKG unremarkable. She was treated with a her dose of Losartan which she did not take today. On my assessment, The patient symptoms resolved. Repeat /76 with remainder of vitals unremarkable. Given history, presentation, in a benign work-up and exam her today, symptoms less likely due to a life threatening cause. Will discharge with PCP follow-up and ED return precaution. Patient and her daughters are aware and agreeable to the plan.     Eleanor Rodrigez         ED Course as of 03/13/25 2124   Thu Mar 13, 2025   1916 CBC W/ AUTO DIFFERENTIAL  CBC unremarkable, no leukocytosis, thrombocytopenia, anemia [HJ]   1942 Troponin I  ACS much less likely [HJ]   2001 Comprehensive metabolic panel(!)  CMP largely unremarkable [HJ]   2001 TSH: 1.751  Much less likely thyroid disease [HJ]   2006 CT Head Without Contrast  As per my independent interpretation no acute intracranial hemorrhage or stroke seen [HJ]      ED Course User Index  [HJ] Jimbo Knight MD                           Clinical Impression:  Final diagnoses:  [R41.82] AMS (altered mental status)          ED Disposition Condition    Discharge Stable          ED Prescriptions    None       Follow-up Information       Follow up With Specialties Details Why Contact Info    Malaika Coleman MD Internal Medicine Schedule an  appointment as soon as possible for a visit in 3 days  [address] [facility]  [place] [address]  [phone]      [facility] - Emergency Dept Emergency Medicine Go to  As needed, If symptoms worsen [address] [facility] [place] [id number] [phone]               Jimbo Knight MD  Resident  03/13/25 2124           [1]  Social History  Tobacco Use    Smoking status: Never    Smokeless tobacco: Never   Substance Use Topics    Alcohol use: No    Drug use: No      Eleanor Rodrigez, DO  03/18/25 [id number]

## 2025-03-15 LAB
OHS QRS DURATION: 102 MS
OHS QTC CALCULATION: 432 MS